# Patient Record
Sex: MALE | Race: WHITE | Employment: UNEMPLOYED | URBAN - METROPOLITAN AREA
[De-identification: names, ages, dates, MRNs, and addresses within clinical notes are randomized per-mention and may not be internally consistent; named-entity substitution may affect disease eponyms.]

---

## 2020-11-08 ENCOUNTER — HOSPITAL ENCOUNTER (EMERGENCY)
Age: 40
Discharge: HOME | End: 2020-11-09
Payer: COMMERCIAL

## 2020-11-08 LAB
ALBUMIN SERPL-MCNC: 3.6 G/DL (ref 3.5–4.6)
ALP BLD-CCNC: 104 U/L (ref 35–104)
ALT SERPL-CCNC: <5 U/L (ref 0–41)
ANION GAP SERPL CALCULATED.3IONS-SCNC: 8 MEQ/L (ref 9–15)
ANION GAP SERPL CALCULATED.3IONS-SCNC: 9 MEQ/L (ref 9–15)
AST SERPL-CCNC: 53 U/L (ref 0–40)
BANDED NEUTROPHILS RELATIVE PERCENT: 1 %
BASOPHILS ABSOLUTE: 0 K/UL (ref 0–0.2)
BASOPHILS RELATIVE PERCENT: 2 %
BETA-HYDROXYBUTYRATE: 8.2 MG/DL (ref 0.2–2.8)
BILIRUB SERPL-MCNC: <0.2 MG/DL (ref 0.2–0.7)
BILIRUBIN URINE: NEGATIVE
BLOOD, URINE: NEGATIVE
BUN BLDV-MCNC: 13 MG/DL (ref 6–20)
BUN BLDV-MCNC: 15 MG/DL (ref 6–20)
CALCIUM SERPL-MCNC: 8.2 MG/DL (ref 8.5–9.9)
CALCIUM SERPL-MCNC: 8.5 MG/DL (ref 8.5–9.9)
CHLORIDE BLD-SCNC: 84 MEQ/L (ref 95–107)
CHLORIDE BLD-SCNC: 86 MEQ/L (ref 95–107)
CHP ED QC CHECK: YES
CHP ED QC CHECK: YES
CLARITY: CLEAR
CO2: 21 MEQ/L (ref 20–31)
CO2: 22 MEQ/L (ref 20–31)
COLOR: YELLOW
CREAT SERPL-MCNC: 0.54 MG/DL (ref 0.7–1.2)
CREAT SERPL-MCNC: 0.62 MG/DL (ref 0.7–1.2)
EOSINOPHILS ABSOLUTE: 0.2 K/UL (ref 0–0.7)
EOSINOPHILS RELATIVE PERCENT: 3 %
GFR AFRICAN AMERICAN: >60
GFR AFRICAN AMERICAN: >60
GFR NON-AFRICAN AMERICAN: >60
GFR NON-AFRICAN AMERICAN: >60
GLOBULIN: 2.7 G/DL (ref 2.3–3.5)
GLUCOSE BLD-MCNC: 322 MG/DL (ref 70–99)
GLUCOSE BLD-MCNC: 365 MG/DL
GLUCOSE BLD-MCNC: 365 MG/DL (ref 60–115)
GLUCOSE BLD-MCNC: 437 MG/DL
GLUCOSE BLD-MCNC: 437 MG/DL (ref 60–115)
GLUCOSE BLD-MCNC: 468 MG/DL (ref 70–99)
GLUCOSE URINE: >=1000 MG/DL
HBA1C MFR BLD: 12.9 % (ref 4.8–5.9)
HCT VFR BLD CALC: 44.9 % (ref 42–52)
HEMOGLOBIN: 16.4 G/DL (ref 14–18)
KETONES, URINE: ABNORMAL MG/DL
LEUKOCYTE ESTERASE, URINE: NEGATIVE
LYMPHOCYTES ABSOLUTE: 2.6 K/UL (ref 1–4.8)
LYMPHOCYTES RELATIVE PERCENT: 33 %
MCH RBC QN AUTO: 33.5 PG (ref 27–31.3)
MCHC RBC AUTO-ENTMCNC: 36.6 % (ref 33–37)
MCV RBC AUTO: 91.5 FL (ref 80–100)
MONOCYTES ABSOLUTE: 0.5 K/UL (ref 0.2–0.8)
MONOCYTES RELATIVE PERCENT: 5.7 %
MYELOCYTE PERCENT: 2 %
NEUTROPHILS ABSOLUTE: 4.6 K/UL (ref 1.4–6.5)
NEUTROPHILS RELATIVE PERCENT: 55 %
NITRITE, URINE: NEGATIVE
PDW BLD-RTO: 13.5 % (ref 11.5–14.5)
PERFORMED ON: ABNORMAL
PERFORMED ON: ABNORMAL
PH UA: 6.5 (ref 5–9)
PLATELET # BLD: 267 K/UL (ref 130–400)
PLATELET SLIDE REVIEW: NORMAL
POTASSIUM SERPL-SCNC: 6.1 MEQ/L (ref 3.4–4.9)
POTASSIUM SERPL-SCNC: 6.1 MEQ/L (ref 3.4–4.9)
PROTEIN UA: NEGATIVE MG/DL
RBC # BLD: 4.91 M/UL (ref 4.7–6.1)
REASON FOR REJECTION: NORMAL
REASON FOR REJECTION: NORMAL
REJECTED TEST: NORMAL
REJECTED TEST: NORMAL
SODIUM BLD-SCNC: 114 MEQ/L (ref 135–144)
SODIUM BLD-SCNC: 116 MEQ/L (ref 135–144)
SPECIFIC GRAVITY UA: 1.03 (ref 1–1.03)
TOTAL PROTEIN: 6.3 G/DL (ref 6.3–8)
URINE REFLEX TO CULTURE: ABNORMAL
UROBILINOGEN, URINE: 0.2 E.U./DL
WBC # BLD: 8 K/UL (ref 4.8–10.8)

## 2020-11-08 PROCEDURE — 96374 THER/PROPH/DIAG INJ IV PUSH: CPT

## 2020-11-08 PROCEDURE — 96376 TX/PRO/DX INJ SAME DRUG ADON: CPT

## 2020-11-08 PROCEDURE — 2580000003 HC RX 258: Performed by: PERSONAL EMERGENCY RESPONSE ATTENDANT

## 2020-11-08 PROCEDURE — 96375 TX/PRO/DX INJ NEW DRUG ADDON: CPT

## 2020-11-08 PROCEDURE — 85025 COMPLETE CBC W/AUTO DIFF WBC: CPT

## 2020-11-08 PROCEDURE — 80053 COMPREHEN METABOLIC PANEL: CPT

## 2020-11-08 PROCEDURE — 6370000000 HC RX 637 (ALT 250 FOR IP): Performed by: PERSONAL EMERGENCY RESPONSE ATTENDANT

## 2020-11-08 PROCEDURE — 83036 HEMOGLOBIN GLYCOSYLATED A1C: CPT

## 2020-11-08 PROCEDURE — 82010 KETONE BODYS QUAN: CPT

## 2020-11-08 PROCEDURE — 36415 COLL VENOUS BLD VENIPUNCTURE: CPT

## 2020-11-08 PROCEDURE — 96361 HYDRATE IV INFUSION ADD-ON: CPT

## 2020-11-08 PROCEDURE — 99283 EMERGENCY DEPT VISIT LOW MDM: CPT

## 2020-11-08 PROCEDURE — 81003 URINALYSIS AUTO W/O SCOPE: CPT

## 2020-11-08 RX ORDER — AMOXICILLIN AND CLAVULANATE POTASSIUM 875; 125 MG/1; MG/1
1 TABLET, FILM COATED ORAL ONCE
Status: COMPLETED | OUTPATIENT
Start: 2020-11-08 | End: 2020-11-08

## 2020-11-08 RX ORDER — 0.9 % SODIUM CHLORIDE 0.9 %
1000 INTRAVENOUS SOLUTION INTRAVENOUS ONCE
Status: COMPLETED | OUTPATIENT
Start: 2020-11-08 | End: 2020-11-08

## 2020-11-08 RX ORDER — SODIUM POLYSTYRENE SULFONATE 15 G/60ML
30 SUSPENSION ORAL; RECTAL ONCE
Status: COMPLETED | OUTPATIENT
Start: 2020-11-08 | End: 2020-11-09

## 2020-11-08 RX ORDER — 0.9 % SODIUM CHLORIDE 0.9 %
1000 INTRAVENOUS SOLUTION INTRAVENOUS ONCE
Status: COMPLETED | OUTPATIENT
Start: 2020-11-08 | End: 2020-11-09

## 2020-11-08 RX ORDER — CALCIUM CHLORIDE 100 MG/ML
1 INJECTION INTRAVENOUS; INTRAVENTRICULAR ONCE
Status: COMPLETED | OUTPATIENT
Start: 2020-11-08 | End: 2020-11-09

## 2020-11-08 RX ORDER — AMOXICILLIN AND CLAVULANATE POTASSIUM 875; 125 MG/1; MG/1
1 TABLET, FILM COATED ORAL 2 TIMES DAILY
Qty: 20 TABLET | Refills: 0 | Status: SHIPPED | OUTPATIENT
Start: 2020-11-08 | End: 2020-11-18

## 2020-11-08 RX ADMIN — INSULIN HUMAN 10 UNITS: 100 INJECTION, SOLUTION PARENTERAL at 21:31

## 2020-11-08 RX ADMIN — AMOXICILLIN AND CLAVULANATE POTASSIUM 1 TABLET: 875; 125 TABLET, FILM COATED ORAL at 17:51

## 2020-11-08 RX ADMIN — SODIUM CHLORIDE 1000 ML: 9 INJECTION, SOLUTION INTRAVENOUS at 21:30

## 2020-11-08 RX ADMIN — SODIUM CHLORIDE 1000 ML: 9 INJECTION, SOLUTION INTRAVENOUS at 17:51

## 2020-11-08 ASSESSMENT — ENCOUNTER SYMPTOMS
VOMITING: 0
NAUSEA: 0
SHORTNESS OF BREATH: 0
BLOOD IN STOOL: 0
DIARRHEA: 0
RHINORRHEA: 0
COLOR CHANGE: 0
SORE THROAT: 0
ABDOMINAL PAIN: 0
COUGH: 1

## 2020-11-08 ASSESSMENT — PAIN DESCRIPTION - ORIENTATION: ORIENTATION: RIGHT

## 2020-11-08 ASSESSMENT — PAIN DESCRIPTION - LOCATION: LOCATION: EAR

## 2020-11-08 ASSESSMENT — PAIN SCALES - GENERAL: PAINLEVEL_OUTOF10: 7

## 2020-11-08 NOTE — ED TRIAGE NOTES
Pt comes to er  Thinking that his blood sugar is high. He  Believes this as he has been thirsty, his mouth has been dry and he has been getting headaches. Pt has never been diagnosed with diabetes.  Pt also  C/o a sinus infection and a right ear ache

## 2020-11-08 NOTE — ED PROVIDER NOTES
[facility] ED  eMERGENCY dEPARTMENT eNCOUnter      Pt Name: Tarsha Saeed  MRN: [id number]  [place] 1980  Date of evaluation: 11/8/2020  Provider: KARY Mcintyre      HISTORY OF PRESENT ILLNESS    Tarsha Saeed is a 36 y.o. male with PMHx of tobacco abuse presents to the emergency department with sinus infection and polyuria. Patient states for the past couple weeks he has increased urination, increased thirst, and feels his mouth is dry. For the past couple days he has had a right earache with postnasal drip and chest congestion, dark green mucus. He denies fevers, Covid concern, chest pain, shortness of breath, belly pain, nausea, vomiting, diarrhea. No ill contacts. He does get headaches in the morning but states it goes away after Tylenol. HPI    Nursing Notes were reviewed. REVIEW OF SYSTEMS       Review of Systems   Constitutional: Negative for appetite change, chills and fever. HENT: Positive for ear pain and postnasal drip. Negative for congestion, rhinorrhea and sore throat. Respiratory: Positive for cough. Negative for shortness of breath. Cardiovascular: Negative for chest pain. Gastrointestinal: Negative for abdominal pain, blood in stool, diarrhea, nausea and vomiting. Endocrine: Positive for polydipsia and polyuria. Genitourinary: Positive for frequency. Negative for difficulty urinating. Musculoskeletal: Negative for neck stiffness. Skin: Negative for color change and rash. Neurological: Positive for headaches. Negative for dizziness, syncope, weakness, light-headedness and numbness. All other systems reviewed and are negative. PAST MEDICAL HISTORY   No past medical history on file. SURGICAL HISTORY     No past surgical history on file. CURRENT MEDICATIONS       Previous Medications    No medications on file       ALLERGIES     Patient has no known allergies. FAMILY HISTORY     No family history on file. SOCIAL HISTORY       Social History     Socioeconomic History    Marital status:      Spouse name: Not on file    Number of children: Not on file    Years of education: Not on file    Highest education level: Not on file   Occupational History    Not on file   Social Needs    Financial resource strain: Not on file    Food insecurity     Worry: Not on file     Inability: Not on file    Transportation needs     Medical: Not on file     Non-medical: Not on file   Tobacco Use    Smoking status: Not on file   Substance and Sexual Activity    Alcohol use: Not on file    Drug use: Not on file    Sexual activity: Not on file   Lifestyle    Physical activity     Days per week: Not on file     Minutes per session: Not on file    Stress: Not on file   Relationships    Social connections     Talks on phone: Not on file     Gets together: Not on file     Attends Sikhism service: Not on file     Active member of club or organization: Not on file     Attends meetings of clubs or organizations: Not on file     Relationship status: Not on file    Intimate partner violence     Fear of current or ex partner: Not on file     Emotionally abused: Not on file     Physically abused: Not on file     Forced sexual activity: Not on file   Other Topics Concern    Not on file   Social History Narrative    Not on file         PHYSICAL EXAM         ED Triage Vitals [11/08/20 1713]   BP Temp Temp Source Pulse Resp SpO2 Height Weight   (!) 102/95 97.9 °F (36.6 °C) Oral 116 20 95 % 5' 6\" (1.676 m) 300 lb (136.1 kg)       Physical Exam  Constitutional:       Appearance: He is well-developed. HENT:      Head: Normocephalic and atraumatic. Ears:      Comments: Bilateral TMs with moderate cerumen, unable to fully visualize left TM but what is visualized shows no otitis media. Right TM erythematous, no perforation noted     Mouth/Throat:      Mouth: Mucous membranes are dry.    Eyes:      Conjunctiva/sclera: Conjunctivae normal.      Pupils: Pupils are equal, round, and reactive to light. Neck:      Musculoskeletal: Normal range of motion and neck supple. Trachea: No tracheal deviation. Cardiovascular:      Heart sounds: Normal heart sounds. Pulmonary:      Effort: Pulmonary effort is normal. No respiratory distress. Breath sounds: Normal breath sounds. No stridor. Abdominal:      General: Bowel sounds are normal. There is no distension. Palpations: Abdomen is soft. There is no mass. Tenderness: There is no abdominal tenderness. There is no guarding or rebound. Musculoskeletal: Normal range of motion. Skin:     General: Skin is warm and dry. Capillary Refill: Capillary refill takes less than 2 seconds. Findings: No rash. Neurological:      Mental Status: He is alert and oriented to person, place, and time. Deep Tendon Reflexes: Reflexes are normal and symmetric. Psychiatric:         Behavior: Behavior normal.         Thought Content: Thought content normal.         Judgment: Judgment normal.         DIAGNOSTIC RESULTS     EKG:All EKG's are interpreted by the Emergency Department Physician who either signs or Co-signs this chart in the absence of a cardiologist.    EKG shows sinus tachycardia, rate 104, normal intervals, normal axis, no ST segment changes.   No peak T waves    RADIOLOGY:   Non-plain film images such as CT, Ultrasound and MRI are read by theradiologist. Plain radiographic images are visualized and preliminarily interpreted by the emergency physician with the below findings:    Interpretation per theRadiologist below, if available at the time of this note:    No orders to display           LABS:  Labs Reviewed   CBC WITH AUTO DIFFERENTIAL - Abnormal; Notable for the following components:       Result Value    MCH 33.5 (*)     All other components within normal limits   URINE RT REFLEX TO CULTURE - Abnormal; Notable for the following components:    Glucose, Ur >=1000 (*)     Ketones, Urine TRACE (*)     All other components within normal limits   BETA-HYDROXYBUTYRATE - Abnormal; Notable for the following components:    Beta-Hydroxybutyrate 8.2 (*)     All other components within normal limits   HEMOGLOBIN A1C - Abnormal; Notable for the following components:    Hemoglobin A1C 12.9 (*)     All other components within normal limits   COMPREHENSIVE METABOLIC PANEL - Abnormal; Notable for the following components:    Sodium 114 (*)     Potassium 6.1 (*)     Chloride 84 (*)     Glucose 468 (*)     CREATININE 0.54 (*)     AST 53 (*)     All other components within normal limits    Narrative:     REDRAW  CALL  [place]  [facility] tel. [phone],  GLU, SODIUM, POTASSIUM results called to and read back by [facility] DILCIA RN,  11/08/2020 21:01, by Fredοsergio 193 - Abnormal; Notable for the following components:    Sodium 116 (*)     Potassium 6.1 (*)     Chloride 86 (*)     Anion Gap 8 (*)     Glucose 322 (*)     CREATININE 0.62 (*)     Calcium 8.2 (*)     All other components within normal limits    Narrative:     CALL  [facility] tel. [phone],  SODIUM, POTASSIUM results called to and read back by [facility] DILCIA RN,  11/08/2020 23:28, by Marthaμποsergio 193 - Abnormal; Notable for the following components:    Sodium 118 (*)     Potassium 5.1 (*)     Chloride 87 (*)     Glucose 295 (*)     CREATININE 0.61 (*)     All other components within normal limits    Narrative:     CALL  [facility] tel. [phone],  SODIUM results called to and read back by [facility] DILCIA RN, 11/09/2020  02:25, by [facility]   POCT GLUCOSE - Abnormal; Notable for the following components:    POC Glucose 437 (*)     All other components within normal limits   POCT GLUCOSE - Abnormal; Notable for the following components:    POC Glucose 365 (*)     All other components within normal limits   POCT GLUCOSE - Normal   POCT GLUCOSE - Normal   SPECIMEN REJECTION   SPECIMEN REJECTION    Narrative: ORDER WAS CANCELLED 11/08/2020 19:49, Rejected: Hemolyzed. Redraw       All other labs were within normal range or not returned as of this dictation. EMERGENCY DEPARTMENT COURSE and DIFFERENTIAL DIAGNOSIS/MDM:   Vitals:    Vitals:    11/08/20 2100 11/08/20 2200 11/08/20 2230 11/09/20 0115   BP: (!) 132/97 (!) 158/98 (!) 156/96 (!) 158/100   Pulse:  105 101 110   Resp:  14 18 20   Temp:       TempSrc:       SpO2: 94% 97% 96% 97%   Weight:       Height:             MDM    Patient does have new onset type 2 diabetes. Sodium 114, corrected 120, potassium 6.1, chloride 84, glucose 468, hemoglobin A1c 12.9, beta hydroxy 8.2. Patient was given 2 L IV fluids, insulin 10 units IV. BMP repeated. Minimal change. Sodium 116, potassium 6.1, glucose 322. Patient states he cannot be admitted to the hospital.  He has a 5year-old autistic son at home. His fiancée is currently watching the son but she does work in the morning. He is refusing hospital admission. He is willing to stay in the emergency room until lab work improves. EKG ordered, calcium, insulin, Kayexalate, 1 L IV fluids. Repeat BMP shows sodium 118 (corrected 120), potassium improved to 5.1, chloride 87, glucose 295. Patient was given additional 1 L IV fluid and DC'd with Augmentin and Metformin with endocrinology follow up. Standard anticipatory guidance given to patient upon discharge. Have given them a specific time frame in which to follow-up and who to follow-up with. I have also advised them that they should return to the emergency department if they get worse, or not getting better or develop any new or concerning symptoms. Patient demonstrates understanding. CRITICAL CARE TIME   Total Critical Caretime was 0 minutes, excluding separately reportable procedures. There was a high probability of clinically significant/life threatening deterioration in the patient's condition which required my urgent intervention. Procedures    FINAL IMPRESSION      1. New onset type 2 diabetes mellitus ([facility] [address]    2. Non-recurrent acute serous otitis media of right ear    3. Type 2 diabetes mellitus with hyperglycemia, without long-term current use of insulin ([facility] [address]    4. Hyperkalemia    5. Hyponatremia          DISPOSITION/PLAN   DISPOSITION  11/08/2020 11:33:53 PM      PATIENT REFERRED TO:  Jocelyne Harris MD  [facility] [address] [facility]    Call         DISCHARGE MEDICATIONS:  New Prescriptions    AMOXICILLIN-CLAVULANATE (AUGMENTIN) 875-125 MG PER TABLET    Take 1 tablet by mouth 2 times daily for 10 days    METFORMIN (GLUCOPHAGE) 500 MG TABLET    Take 1 tablet by mouth 2 times daily (with meals)          (Please notethat portions of this note were completed with a voice recognition program.  Efforts were made to edit the dictations but occasionally words are mis-transcribed. )    KARY Mcintyre (electronically signed)  Emergency Physician Assistant         Leyda Ramires, [place]  11/12/20 6177

## 2020-11-09 ENCOUNTER — OFFICE VISIT (OUTPATIENT)
Dept: ENDOCRINOLOGY | Age: 40
End: 2020-11-09
Payer: COMMERCIAL

## 2020-11-09 VITALS
DIASTOLIC BLOOD PRESSURE: 93 MMHG | WEIGHT: 287 LBS | BODY MASS INDEX: 46.12 KG/M2 | SYSTOLIC BLOOD PRESSURE: 158 MMHG | HEIGHT: 66 IN | HEART RATE: 99 BPM

## 2020-11-09 VITALS
OXYGEN SATURATION: 94 % | HEART RATE: 104 BPM | DIASTOLIC BLOOD PRESSURE: 95 MMHG | BODY MASS INDEX: 48.21 KG/M2 | RESPIRATION RATE: 20 BRPM | SYSTOLIC BLOOD PRESSURE: 188 MMHG | WEIGHT: 300 LBS | TEMPERATURE: 97.9 F | HEIGHT: 66 IN

## 2020-11-09 PROBLEM — I10 ESSENTIAL HYPERTENSION: Status: ACTIVE | Noted: 2020-11-09

## 2020-11-09 LAB
ANION GAP SERPL CALCULATED.3IONS-SCNC: 9 MEQ/L (ref 9–15)
BUN BLDV-MCNC: 11 MG/DL (ref 6–20)
CALCIUM SERPL-MCNC: 8.7 MG/DL (ref 8.5–9.9)
CHLORIDE BLD-SCNC: 87 MEQ/L (ref 95–107)
CHP ED QC CHECK: NORMAL
CO2: 22 MEQ/L (ref 20–31)
CREAT SERPL-MCNC: 0.61 MG/DL (ref 0.7–1.2)
EKG ATRIAL RATE: 104 BPM
EKG P AXIS: 50 DEGREES
EKG P-R INTERVAL: 170 MS
EKG Q-T INTERVAL: 348 MS
EKG QRS DURATION: 94 MS
EKG QTC CALCULATION (BAZETT): 457 MS
EKG R AXIS: 19 DEGREES
EKG T AXIS: 75 DEGREES
EKG VENTRICULAR RATE: 104 BPM
GFR AFRICAN AMERICAN: >60
GFR NON-AFRICAN AMERICAN: >60
GLUCOSE BLD-MCNC: 259 MG/DL
GLUCOSE BLD-MCNC: 295 MG/DL (ref 70–99)
POTASSIUM SERPL-SCNC: 5.1 MEQ/L (ref 3.4–4.9)
SODIUM BLD-SCNC: 118 MEQ/L (ref 135–144)

## 2020-11-09 PROCEDURE — 82962 GLUCOSE BLOOD TEST: CPT | Performed by: PHYSICIAN ASSISTANT

## 2020-11-09 PROCEDURE — 2580000003 HC RX 258: Performed by: PERSONAL EMERGENCY RESPONSE ATTENDANT

## 2020-11-09 PROCEDURE — 93005 ELECTROCARDIOGRAM TRACING: CPT | Performed by: PERSONAL EMERGENCY RESPONSE ATTENDANT

## 2020-11-09 PROCEDURE — 93010 ELECTROCARDIOGRAM REPORT: CPT | Performed by: INTERNAL MEDICINE

## 2020-11-09 PROCEDURE — 6370000000 HC RX 637 (ALT 250 FOR IP): Performed by: PERSONAL EMERGENCY RESPONSE ATTENDANT

## 2020-11-09 PROCEDURE — G8417 CALC BMI ABV UP PARAM F/U: HCPCS | Performed by: PHYSICIAN ASSISTANT

## 2020-11-09 PROCEDURE — 4004F PT TOBACCO SCREEN RCVD TLK: CPT | Performed by: PHYSICIAN ASSISTANT

## 2020-11-09 PROCEDURE — G8484 FLU IMMUNIZE NO ADMIN: HCPCS | Performed by: PHYSICIAN ASSISTANT

## 2020-11-09 PROCEDURE — 99204 OFFICE O/P NEW MOD 45 MIN: CPT | Performed by: PHYSICIAN ASSISTANT

## 2020-11-09 PROCEDURE — 2500000003 HC RX 250 WO HCPCS: Performed by: PERSONAL EMERGENCY RESPONSE ATTENDANT

## 2020-11-09 PROCEDURE — 3046F HEMOGLOBIN A1C LEVEL >9.0%: CPT | Performed by: PHYSICIAN ASSISTANT

## 2020-11-09 PROCEDURE — G8427 DOCREV CUR MEDS BY ELIG CLIN: HCPCS | Performed by: PHYSICIAN ASSISTANT

## 2020-11-09 PROCEDURE — 6360000002 HC RX W HCPCS: Performed by: PERSONAL EMERGENCY RESPONSE ATTENDANT

## 2020-11-09 PROCEDURE — 2022F DILAT RTA XM EVC RTNOPTHY: CPT | Performed by: PHYSICIAN ASSISTANT

## 2020-11-09 RX ORDER — LISINOPRIL 20 MG/1
20 TABLET ORAL DAILY
Qty: 30 TABLET | Refills: 5 | Status: SHIPPED | OUTPATIENT
Start: 2020-11-09 | End: 2021-03-24 | Stop reason: SDUPTHER

## 2020-11-09 RX ORDER — LANCETS 30 GAUGE
1 EACH MISCELLANEOUS
Qty: 150 EACH | Refills: 3 | Status: SHIPPED | OUTPATIENT
Start: 2020-11-09 | End: 2021-03-23

## 2020-11-09 RX ORDER — KETOROLAC TROMETHAMINE 30 MG/ML
30 INJECTION, SOLUTION INTRAMUSCULAR; INTRAVENOUS ONCE
Status: COMPLETED | OUTPATIENT
Start: 2020-11-09 | End: 2020-11-09

## 2020-11-09 RX ORDER — INSULIN GLARGINE 100 [IU]/ML
30 INJECTION, SOLUTION SUBCUTANEOUS NIGHTLY
Qty: 5 PEN | Refills: 3 | Status: SHIPPED | OUTPATIENT
Start: 2020-11-09 | End: 2021-03-24 | Stop reason: ALTCHOICE

## 2020-11-09 RX ORDER — 0.9 % SODIUM CHLORIDE 0.9 %
1000 INTRAVENOUS SOLUTION INTRAVENOUS ONCE
Status: COMPLETED | OUTPATIENT
Start: 2020-11-09 | End: 2020-11-09

## 2020-11-09 RX ORDER — GLUCOSAMINE HCL/CHONDROITIN SU 500-400 MG
1 CAPSULE ORAL
Qty: 150 STRIP | Refills: 3 | Status: SHIPPED | OUTPATIENT
Start: 2020-11-09 | End: 2021-03-23

## 2020-11-09 RX ORDER — INSULIN LISPRO 100 [IU]/ML
15 INJECTION, SOLUTION INTRAVENOUS; SUBCUTANEOUS
Qty: 5 PEN | Refills: 3 | Status: SHIPPED | OUTPATIENT
Start: 2020-11-09 | End: 2021-03-01

## 2020-11-09 RX ADMIN — SODIUM POLYSTYRENE SULFONATE 30 G: 15 SUSPENSION ORAL; RECTAL at 00:33

## 2020-11-09 RX ADMIN — KETOROLAC TROMETHAMINE 30 MG: 30 INJECTION, SOLUTION INTRAMUSCULAR at 02:05

## 2020-11-09 RX ADMIN — CALCIUM CHLORIDE 1 G: 100 INJECTION, SOLUTION INTRAVENOUS; INTRAVENTRICULAR at 00:33

## 2020-11-09 RX ADMIN — SODIUM CHLORIDE 1000 ML: 9 INJECTION, SOLUTION INTRAVENOUS at 00:33

## 2020-11-09 RX ADMIN — SODIUM CHLORIDE 1000 ML: 9 INJECTION, SOLUTION INTRAVENOUS at 02:48

## 2020-11-09 RX ADMIN — INSULIN HUMAN 8 UNITS: 100 INJECTION, SOLUTION PARENTERAL at 00:33

## 2020-11-09 ASSESSMENT — PAIN DESCRIPTION - PAIN TYPE: TYPE: ACUTE PAIN

## 2020-11-09 ASSESSMENT — PAIN SCALES - GENERAL: PAINLEVEL_OUTOF10: 3

## 2020-11-09 ASSESSMENT — ENCOUNTER SYMPTOMS
SHORTNESS OF BREATH: 0
WHEEZING: 0
COUGH: 0
SINUS PRESSURE: 0
VOMITING: 0
ABDOMINAL PAIN: 0
NAUSEA: 0
SORE THROAT: 0
DIARRHEA: 0
BLURRED VISION: 1
EYE REDNESS: 0
EYE PAIN: 0
RHINORRHEA: 0

## 2020-11-09 ASSESSMENT — PAIN DESCRIPTION - LOCATION: LOCATION: EAR

## 2020-11-09 ASSESSMENT — PAIN DESCRIPTION - ORIENTATION: ORIENTATION: RIGHT

## 2020-11-09 NOTE — PATIENT INSTRUCTIONS
Endocrinology-diabetes    1. Check your blood sugars 4 times a day, before meals and at night  2. Document these numbers and a blood glucose log and bring them with you to your follow-up appointment. 3. Do not take your mealtime insulin if your blood sugars less than 120  4. Call our office if you have blood sugars less than 80 or greater then 300 on two or more occasions  5. Call our office if you have any questions regarding your blood sugars or insulin dosing regiment  6. Signs of low blood sugar include sweating , heart racing, dizziness and weakness. Check your blood sugar if you have any of these symptoms. Medications-  7. Lantus 30 unit at night  8. Humalog 15 units three times daily before meals  9. Metformin 500 mg twice daily before meals   10. Lisinopril 20 mg daily   11. Freestyle Gautam ordered   12. Glucometer ordered   13. Diabetic Education ordered  14.  Follow up in 4 weeks

## 2020-11-09 NOTE — ED NOTES
Sodium 114  Potassium 6.1    Hemolyzed, per  blood looks like strawberry milk.       Geronimo Merino RN  11/08/20 7607

## 2020-11-09 NOTE — PROGRESS NOTES
2020    Assessment:       Diagnosis Orders   1. Uncontrolled type 2 diabetes mellitus with hyperglycemia ([address]  POCT Glucose    Lipid Panel    Lila Johnson    C-Peptide   2. Essential hypertension       PLAN:     1. Start Lantus 30 unit at night  2. Start Humalog 15 units three times daily before meals  3. Start Metformin 500 mg twice daily before meals   4. Start lisinopril 20 mg daily   5. Freestyle Gautam ordered   6. Glucometer ordered   7. Diabetic Education ordered  8. Refer to Doe Mera PA-C for primary care at next visit  9.  Follow up in 4 weeks       Orders Placed This Encounter   Procedures    Lipid Panel     Standing Status:   Future     Standing Expiration Date:   2021     Order Specific Question:   Is Patient Fasting?/# of Hours     Answer:   8    C-Peptide     Standing Status:   Future     Standing Expiration Date:   2021   [facility] Lila Sylvester     Referral Priority:   Routine     Referral Type:   Eval and Treat     Referral Reason:   Specialty Services Required     Number of Visits Requested:   1    POCT Glucose     Orders Placed This Encounter   Medications    insulin glargine (LANTUS SOLOSTAR) 100 UNIT/ML injection pen     Sig: Inject 30 Units into the skin nightly     Dispense:  5 pen     Refill:  3    insulin lispro, 1 Unit Dial, (HUMALOG KWIKPEN) 100 UNIT/ML SOPN     Sig: Inject 15 Units into the skin 3 times daily (before meals)     Dispense:  5 pen     Refill:  3    Insulin Pen Needle (NOVOFINE) 32G X 6 MM MISC     Si Device by Does not apply route 4 times daily (before meals and nightly)     Dispense:  300 each     Refill:  3    Continuous Blood Gluc Sensor (FREESTYLE GAUTAM 2 SENSOR SYSTM) MISC     Si Device by Does not apply route continuous     Dispense:  2 each     Refill:  3    Continuous Blood Gluc  (FREESTYLE GAUTAM 2 READER SYSTM) ANABEL     Si Device by Does not apply route continuous     Dispense:  1 Device Refill:  0    blood glucose monitor strips     Si strip by Other route 4 times daily (before meals and nightly) Pt test 4x daily Dx E11.65. May substitute for generic or insurance covered product     Dispense:  150 strip     Refill:  3    blood glucose monitor kit and supplies     Si kit by Other route 4 times daily (before meals and nightly) Pt test 4x daily Dx E11.65. May substitute for generic or insurance covered product     Dispense:  1 kit     Refill:  0    Lancets MISC     Si each by Does not apply route 4 times daily (before meals and nightly) Pt test 4x daily Dx E11.65. May substitute for generic or insurance covered product     Dispense:  150 each     Refill:  3    lisinopril (PRINIVIL;ZESTRIL) 20 MG tablet     Sig: Take 1 tablet by mouth daily     Dispense:  30 tablet     Refill:  5     Return in about 4 weeks (around 2020) for Diabetes. Subjective:     Chief Complaint   Patient presents with    Diabetes     Vitals:    20 1210 20 1220   BP: (!) 172/125 (!) 158/93   Site: Right Upper Arm    Position: Sitting    Cuff Size: Large Adult    Pulse: 99    Weight: 287 lb (130.2 kg)    Height: 5' 6\" (1.676 m)      Wt Readings from Last 3 Encounters:   20 287 lb (130.2 kg)   20 300 lb (136.1 kg)     BP Readings from Last 3 Encounters:   20 (!) 158/93   20 (!) 188/95     Mary French is a 66-year-old male who went to the ER on 2020 with severe nasal congestion and right ear pain. Laboratory studies revealed severe hyperglycemia with hyponatremia and hyperkalemia. His A1c is 12.9%. The patient denied hospital admission, was kept in the ER for many hours and given IV insulin and 4 L of IV fluid with symptomatic relief. He was discharged home in antibiotics and is here to follow-up with me today regarding his diabetes. Diabetes   He presents for his initial diabetic visit. He has type 2 diabetes mellitus. His disease course has been worsening.  There file    Drug use: Not on file    Sexual activity: Not on file   Lifestyle    Physical activity     Days per week: Not on file     Minutes per session: Not on file    Stress: Not on file   Relationships    Social connections     Talks on phone: Not on file     Gets together: Not on file     Attends Catholic service: Not on file     Active member of club or organization: Not on file     Attends meetings of clubs or organizations: Not on file     Relationship status: Not on file    Intimate partner violence     Fear of current or ex partner: Not on file     Emotionally abused: Not on file     Physically abused: Not on file     Forced sexual activity: Not on file   Other Topics Concern    Not on file   Social History Narrative    Not on file     History reviewed. No pertinent family history. No Known Allergies    Current Outpatient Medications:     insulin glargine (LANTUS SOLOSTAR) 100 UNIT/ML injection pen, Inject 30 Units into the skin nightly, Disp: 5 pen, Rfl: 3    insulin lispro, 1 Unit Dial, (HUMALOG KWIKPEN) 100 UNIT/ML SOPN, Inject 15 Units into the skin 3 times daily (before meals), Disp: 5 pen, Rfl: 3    Insulin Pen Needle (NOVOFINE) 32G X 6 MM MISC, 1 Device by Does not apply route 4 times daily (before meals and nightly), Disp: 300 each, Rfl: 3    Continuous Blood Gluc Sensor (FREESTYLE JEFF 2 SENSOR SYSTM) MISC, 1 Device by Does not apply route continuous, Disp: 2 each, Rfl: 3    Continuous Blood Gluc  (FREESTYLE JEFF 2 READER SYSTM) ANABEL, 1 Device by Does not apply route continuous, Disp: 1 Device, Rfl: 0    blood glucose monitor strips, 1 strip by Other route 4 times daily (before meals and nightly) Pt test 4x daily Dx E11.65. May substitute for generic or insurance covered product, Disp: 150 strip, Rfl: 3    blood glucose monitor kit and supplies, 1 kit by Other route 4 times daily (before meals and nightly) Pt test 4x daily Dx E11.65.   May substitute for generic or insurance covered product, Disp: 1 kit, Rfl: 0    Lancets MISC, 1 each by Does not apply route 4 times daily (before meals and nightly) Pt test 4x daily Dx E11.65. May substitute for generic or insurance covered product, Disp: 150 each, Rfl: 3    lisinopril (PRINIVIL;ZESTRIL) 20 MG tablet, Take 1 tablet by mouth daily, Disp: 30 tablet, Rfl: 5    metFORMIN (GLUCOPHAGE) 500 MG tablet, Take 1 tablet by mouth 2 times daily (with meals), Disp: 60 tablet, Rfl: 0    amoxicillin-clavulanate (AUGMENTIN) 875-125 MG per tablet, Take 1 tablet by mouth 2 times daily for 10 days, Disp: 20 tablet, Rfl: 0  Lab Results   Component Value Date     (LL) 11/08/2020    K 5.1 (H) 11/08/2020    CL 87 (L) 11/08/2020    CO2 22 11/08/2020    BUN 11 11/08/2020    CREATININE 0.61 (L) 11/08/2020    GLUCOSE 259 11/09/2020    CALCIUM 8.7 11/08/2020    PROT 6.3 11/08/2020    LABALBU 3.6 11/08/2020    BILITOT <0.2 11/08/2020    ALKPHOS 104 11/08/2020    AST 53 (H) 11/08/2020    ALT <5 11/08/2020    LABGLOM >60.0 11/08/2020    GFRAA >60.0 11/08/2020    GLOB 2.7 11/08/2020     Lab Results   Component Value Date    WBC 8.0 11/08/2020    HGB 16.4 11/08/2020    HCT 44.9 11/08/2020    MCV 91.5 11/08/2020     11/08/2020     Lab Results   Component Value Date    LABA1C 12.9 (H) 11/08/2020     No results found for: CHOLFAST, TRIGLYCFAST, HDL, LDLCALC, CHOL, TRIG  No results found for: TESTM  No results found for: TSH, TSHREFLEX, FT3, T4FREE  No results found for: TPOABS    Review of Systems   Constitutional: Positive for fatigue. Negative for chills and fever. HENT: Negative for congestion, ear pain, postnasal drip, rhinorrhea, sinus pressure and sore throat. Eyes: Positive for blurred vision. Negative for pain and redness. Respiratory: Negative for cough, shortness of breath and wheezing. Cardiovascular: Negative for chest pain, palpitations and leg swelling. Gastrointestinal: Negative for abdominal pain, diarrhea, nausea and vomiting.

## 2020-11-17 ENCOUNTER — TELEPHONE (OUTPATIENT)
Dept: ENDOCRINOLOGY | Age: 40
End: 2020-11-17

## 2020-12-05 DIAGNOSIS — E11.65 UNCONTROLLED TYPE 2 DIABETES MELLITUS WITH HYPERGLYCEMIA (HCC): ICD-10-CM

## 2020-12-05 LAB
ANION GAP SERPL CALCULATED.3IONS-SCNC: 10 MEQ/L (ref 9–15)
BILIRUBIN URINE: NEGATIVE
BLOOD, URINE: NEGATIVE
BUN BLDV-MCNC: 17 MG/DL (ref 6–20)
CALCIUM SERPL-MCNC: 9.4 MG/DL (ref 8.5–9.9)
CHLORIDE BLD-SCNC: 101 MEQ/L (ref 95–107)
CHOLESTEROL, TOTAL: 229 MG/DL (ref 0–199)
CLARITY: CLEAR
CO2: 25 MEQ/L (ref 20–31)
COLOR: YELLOW
CREAT SERPL-MCNC: 0.56 MG/DL (ref 0.7–1.2)
CREATININE URINE: 194.8 MG/DL
GFR AFRICAN AMERICAN: >60
GFR NON-AFRICAN AMERICAN: >60
GLUCOSE BLD-MCNC: 193 MG/DL (ref 70–99)
GLUCOSE URINE: NEGATIVE MG/DL
HDLC SERPL-MCNC: 34 MG/DL (ref 40–59)
KETONES, URINE: NEGATIVE MG/DL
LDL CHOLESTEROL CALCULATED: 149 MG/DL (ref 0–129)
LEUKOCYTE ESTERASE, URINE: NEGATIVE
MICROALBUMIN UR-MCNC: 6.5 MG/DL
MICROALBUMIN/CREAT UR-RTO: 33.4 MG/G (ref 0–30)
NITRITE, URINE: NEGATIVE
PH UA: 5 (ref 5–9)
POTASSIUM SERPL-SCNC: 4.3 MEQ/L (ref 3.4–4.9)
PROTEIN UA: ABNORMAL MG/DL
SODIUM BLD-SCNC: 136 MEQ/L (ref 135–144)
SPECIFIC GRAVITY UA: 1.02 (ref 1–1.03)
TRIGL SERPL-MCNC: 228 MG/DL (ref 0–150)
TSH SERPL DL<=0.05 MIU/L-ACNC: 1.78 UIU/ML (ref 0.44–3.86)
UROBILINOGEN, URINE: 0.2 E.U./DL
VITAMIN D 25-HYDROXY: 11.1 NG/ML (ref 30–100)

## 2020-12-08 LAB — C-PEPTIDE: 4.4 NG/ML (ref 1.1–4.4)

## 2020-12-09 ENCOUNTER — TELEPHONE (OUTPATIENT)
Dept: ENDOCRINOLOGY | Age: 40
End: 2020-12-09

## 2020-12-09 NOTE — TELEPHONE ENCOUNTER
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 He would like to take on line classes since he is at risk due to his diabetes. He wanted to know if you could write a letter to excuse him from attending on campus classes. Please advise.

## 2020-12-16 ENCOUNTER — OFFICE VISIT (OUTPATIENT)
Dept: ENDOCRINOLOGY | Age: 40
End: 2020-12-16
Payer: COMMERCIAL

## 2020-12-16 VITALS
HEART RATE: 88 BPM | BODY MASS INDEX: 45 KG/M2 | WEIGHT: 280 LBS | SYSTOLIC BLOOD PRESSURE: 137 MMHG | HEIGHT: 66 IN | DIASTOLIC BLOOD PRESSURE: 86 MMHG

## 2020-12-16 LAB
CHP ED QC CHECK: NORMAL
GLUCOSE BLD-MCNC: 153 MG/DL

## 2020-12-16 PROCEDURE — 99213 OFFICE O/P EST LOW 20 MIN: CPT | Performed by: PHYSICIAN ASSISTANT

## 2020-12-16 PROCEDURE — 2022F DILAT RTA XM EVC RTNOPTHY: CPT | Performed by: PHYSICIAN ASSISTANT

## 2020-12-16 PROCEDURE — 3046F HEMOGLOBIN A1C LEVEL >9.0%: CPT | Performed by: PHYSICIAN ASSISTANT

## 2020-12-16 PROCEDURE — G8417 CALC BMI ABV UP PARAM F/U: HCPCS | Performed by: PHYSICIAN ASSISTANT

## 2020-12-16 PROCEDURE — G8427 DOCREV CUR MEDS BY ELIG CLIN: HCPCS | Performed by: PHYSICIAN ASSISTANT

## 2020-12-16 PROCEDURE — G8484 FLU IMMUNIZE NO ADMIN: HCPCS | Performed by: PHYSICIAN ASSISTANT

## 2020-12-16 PROCEDURE — 82962 GLUCOSE BLOOD TEST: CPT | Performed by: PHYSICIAN ASSISTANT

## 2020-12-16 PROCEDURE — 4004F PT TOBACCO SCREEN RCVD TLK: CPT | Performed by: PHYSICIAN ASSISTANT

## 2020-12-16 RX ORDER — BUSPIRONE HYDROCHLORIDE 5 MG/1
5 TABLET ORAL 2 TIMES DAILY
COMMUNITY

## 2020-12-16 RX ORDER — DULAGLUTIDE 1.5 MG/.5ML
1.5 INJECTION, SOLUTION SUBCUTANEOUS WEEKLY
Qty: 4 PEN | Refills: 3 | Status: SHIPPED | OUTPATIENT
Start: 2020-12-16 | End: 2021-03-09 | Stop reason: SDUPTHER

## 2020-12-16 RX ORDER — ATORVASTATIN CALCIUM 20 MG/1
20 TABLET, FILM COATED ORAL DAILY
Qty: 90 TABLET | Refills: 1 | Status: SHIPPED | OUTPATIENT
Start: 2020-12-16 | End: 2021-03-24 | Stop reason: SINTOL

## 2020-12-16 ASSESSMENT — ENCOUNTER SYMPTOMS
NAUSEA: 0
ABDOMINAL PAIN: 0
EYE PAIN: 0
RHINORRHEA: 0
SHORTNESS OF BREATH: 0
EYE REDNESS: 0
VOMITING: 0
SINUS PRESSURE: 0
DIARRHEA: 0
COUGH: 0
WHEEZING: 0
BLURRED VISION: 1
SORE THROAT: 0

## 2020-12-16 NOTE — PROGRESS NOTES
file    Drug use: Not on file    Sexual activity: Not on file   Lifestyle    Physical activity     Days per week: Not on file     Minutes per session: Not on file    Stress: Not on file   Relationships    Social connections     Talks on phone: Not on file     Gets together: Not on file     Attends Ho[employer] service: Not on file     Active member of club or organization: Not on file     Attends meetings of clubs or organizations: Not on file     Relationship status: Not on file    Intimate partner violence     Fear of current or ex partner: Not on file     Emotionally abused: Not on file     Physically abused: Not on file     Forced sexual activity: Not on file   Other Topics Concern    Not on file   Social History Narrative    Not on file     No family history on file. No Known Allergies    Current Outpatient Medications:     Cholecalciferol (VITAMIN D3) 25 MCG (1000 UT) CAPS, Take by mouth 4 daily, Disp: , Rfl:     busPIRone (BUSPAR) 5 MG tablet, Take 5 mg by mouth 2 times daily, Disp: , Rfl:     insulin glargine (LANTUS SOLOSTAR) 100 UNIT/ML injection pen, Inject 30 Units into the skin nightly, Disp: 5 pen, Rfl: 3    insulin lispro, 1 Unit Dial, (HUMALOG KWIKPEN) 100 UNIT/ML SOPN, Inject 15 Units into the skin 3 times daily (before meals), Disp: 5 pen, Rfl: 3    Insulin Pen Needle (NOVOFINE) 32G X 6 MM MISC, 1 Device by Does not apply route 4 times daily (before meals and nightly), Disp: 300 each, Rfl: 3    Continuous Blood Gluc Sensor (FREESTYLE JEFF 2 SENSOR SYSTM) MISC, 1 Device by Does not apply route continuous, Disp: 2 each, Rfl: 3    Continuous Blood Gluc  (FREESTYLE JEFF 2 READER SYSTM) ANABEL, 1 Device by Does not apply route continuous, Disp: 1 Device, Rfl: 0    blood glucose monitor strips, 1 strip by Other route 4 times daily (before meals and nightly) Pt test 4x daily Dx E11.65.   May substitute for generic or insurance covered product, Disp: 150 strip, Rfl: 3    blood glucose monitor kit and supplies, 1 kit by Other route 4 times daily (before meals and nightly) Pt test 4x daily Dx E11.65. May substitute for generic or insurance covered product, Disp: 1 kit, Rfl: 0    Lancets MISC, 1 each by Does not apply route 4 times daily (before meals and nightly) Pt test 4x daily Dx E11.65. May substitute for generic or insurance covered product, Disp: 150 each, Rfl: 3    lisinopril (PRINIVIL;ZESTRIL) 20 MG tablet, Take 1 tablet by mouth daily, Disp: 30 tablet, Rfl: 5    metFORMIN (GLUCOPHAGE) 500 MG tablet, Take 1 tablet by mouth 2 times daily (with meals), Disp: 60 tablet, Rfl: 0  Lab Results   Component Value Date     12/05/2020    K 4.3 12/05/2020     12/05/2020    CO2 25 12/05/2020    BUN 17 12/05/2020    CREATININE 0.56 (L) 12/05/2020    GLUCOSE 153 12/16/2020    CALCIUM 9.4 12/05/2020    PROT 6.3 11/08/2020    LABALBU 3.6 11/08/2020    BILITOT <0.2 11/08/2020    ALKPHOS 104 11/08/2020    AST 53 (H) 11/08/2020    ALT <5 11/08/2020    LABGLOM >60.0 12/05/2020    GFRAA >60.0 12/05/2020    GLOB 2.7 11/08/2020     Lab Results   Component Value Date    WBC 8.0 11/08/2020    HGB 16.4 11/08/2020    HCT 44.9 11/08/2020    MCV 91.5 11/08/2020     11/08/2020     Lab Results   Component Value Date    LABA1C 12.9 (H) 11/08/2020     Lab Results   Component Value Date    HDL 34 (L) 12/05/2020    LDLCALC 149 (H) 12/05/2020    CHOL 229 (H) 12/05/2020    TRIG 228 (H) 12/05/2020     No results found for: TESTM  Lab Results   Component Value Date    TSH 1.780 12/05/2020     No results found for: TPOABS    Review of Systems   Constitutional: Positive for fatigue. Negative for chills and fever. HENT: Negative for congestion, ear pain, postnasal drip, rhinorrhea, sinus pressure and sore throat. Eyes: Positive for blurred vision. Negative for pain and redness. Respiratory: Negative for cough, shortness of breath and wheezing.     Cardiovascular: Negative for chest pain, palpitations and leg swelling. Gastrointestinal: Negative for abdominal pain, diarrhea, nausea and vomiting. Endocrine: Positive for polydipsia and polyuria. Genitourinary: Negative for difficulty urinating. Musculoskeletal: Negative for arthralgias. Skin: Negative for rash. Allergic/Immunologic: Negative for environmental allergies. Neurological: Negative for dizziness and headaches. Hematological: Negative for adenopathy. Psychiatric/Behavioral: Negative for dysphoric mood. Objective:   Physical Exam  Vitals signs reviewed. Constitutional:       Appearance: He is well-developed. HENT:      Head: Normocephalic and atraumatic. Nose: No congestion. Mouth/Throat:      Mouth: Mucous membranes are moist.   Eyes:      Conjunctiva/sclera: Conjunctivae normal.   Neck:      Musculoskeletal: Normal range of motion and neck supple. Cardiovascular:      Rate and Rhythm: Normal rate and regular rhythm. Heart sounds: Normal heart sounds. No murmur. Pulmonary:      Effort: Pulmonary effort is normal.      Breath sounds: Normal breath sounds. Abdominal:      General: Bowel sounds are normal.      Palpations: Abdomen is soft. Musculoskeletal: Normal range of motion. General: No swelling. Skin:     General: Skin is warm and dry. Neurological:      Mental Status: He is alert and oriented to person, place, and time.    Psychiatric:         Mood and Affect: Mood normal.

## 2020-12-23 ENCOUNTER — TELEPHONE (OUTPATIENT)
Dept: ENDOCRINOLOGY | Age: 40
End: 2020-12-23

## 2020-12-23 NOTE — TELEPHONE ENCOUNTER
Patient's wife is calling because Akshat's blood sugar has been running pretty low, it has been running between . He is also feeling  week. They are a little concerned and just wanted to make you aware in case you have also have the same concerns. Please advise.

## 2021-01-13 ENCOUNTER — TELEPHONE (OUTPATIENT)
Dept: ENDOCRINOLOGY | Age: 41
End: 2021-01-13

## 2021-01-13 NOTE — TELEPHONE ENCOUNTER
Patient's wife is calling with questions about Akshat's Trulicity. She states that he is on .75mg and she is not sure if he should stay on that or go up to the 1.5 mg. If he is to stay on the lower dose he would need a new Rx sent to the pharmacy. Please advise.

## 2021-01-13 NOTE — TELEPHONE ENCOUNTER
It appears that I have already sent a prescription for Trulicity 1.5 mg weekly to their pharmacy. If he is having no nausea with the lower dose then he should increase his weekly dosing to the 1.5 mg. Can you please call the patient or his wife to confirm.   Thank you

## 2021-02-05 ENCOUNTER — TELEPHONE (OUTPATIENT)
Dept: DIABETES SERVICES | Age: 41
End: 2021-02-05

## 2021-02-05 NOTE — LETTER
[facility] Diabetes Education Department    Date: 02/05/21       RE: Morteza Etienne 1980    Dear Johanna PRESTON   :    Thank you for referring Morteza Etienne  to diabetes education. Unfortunately, Morteza Etienne, has not kept appointments for education. . 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 He just cannot fit it in right now. He did receive diet information while in hospital and has been able to control his glucose with diet he states. He will call us if he can find time for any education. The referral will remain open one year from the original date. Please encourage your patient to schedule an appointment soon to complete the education sessions by calling our office at [phone]. If you have any questions, please contact our office at [phone].   Thank you for this referral.     Sincerely,    Joseline Valdes RN      Diabetes Education [address]

## 2021-02-05 NOTE — PROGRESS NOTES
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 He just cannot fit it in right now. He did receive diet information while in hospital and has been able to control his glucose with diet he states. He will call us if he can find time for any education. Informed patient referral remains open for one year.

## 2021-03-01 RX ORDER — INSULIN LISPRO 100 [IU]/ML
15 INJECTION, SOLUTION INTRAVENOUS; SUBCUTANEOUS
Qty: 15 ML | Refills: 3 | Status: SHIPPED | OUTPATIENT
Start: 2021-03-01 | End: 2021-03-24 | Stop reason: ALTCHOICE

## 2021-03-09 RX ORDER — DULAGLUTIDE 1.5 MG/.5ML
1.5 INJECTION, SOLUTION SUBCUTANEOUS WEEKLY
Qty: 4 PEN | Refills: 3 | COMMUNITY
Start: 2021-03-09 | End: 2021-03-23

## 2021-03-23 RX ORDER — LANCETS 28 GAUGE
EACH MISCELLANEOUS
Qty: 150 EACH | Refills: 3 | Status: SHIPPED | OUTPATIENT
Start: 2021-03-23 | End: 2022-08-31

## 2021-03-23 RX ORDER — DULAGLUTIDE 1.5 MG/.5ML
INJECTION, SOLUTION SUBCUTANEOUS
Qty: 2 ML | Refills: 3 | Status: SHIPPED | OUTPATIENT
Start: 2021-03-23 | End: 2021-04-19 | Stop reason: SDUPTHER

## 2021-03-23 RX ORDER — BLOOD-GLUCOSE METER
KIT MISCELLANEOUS
Qty: 150 STRIP | Refills: 3 | Status: SHIPPED | OUTPATIENT
Start: 2021-03-23 | End: 2022-05-24 | Stop reason: ALTCHOICE

## 2021-03-24 ENCOUNTER — OFFICE VISIT (OUTPATIENT)
Dept: ENDOCRINOLOGY | Age: 41
End: 2021-03-24
Payer: COMMERCIAL

## 2021-03-24 VITALS
SYSTOLIC BLOOD PRESSURE: 135 MMHG | DIASTOLIC BLOOD PRESSURE: 86 MMHG | WEIGHT: 285 LBS | BODY MASS INDEX: 45.8 KG/M2 | HEIGHT: 66 IN | HEART RATE: 83 BPM

## 2021-03-24 DIAGNOSIS — E11.65 UNCONTROLLED TYPE 2 DIABETES MELLITUS WITH HYPERGLYCEMIA (HCC): Primary | ICD-10-CM

## 2021-03-24 DIAGNOSIS — E11.65 UNCONTROLLED TYPE 2 DIABETES MELLITUS WITH HYPERGLYCEMIA (HCC): ICD-10-CM

## 2021-03-24 DIAGNOSIS — E78.00 HYPERCHOLESTEREMIA: ICD-10-CM

## 2021-03-24 LAB
ALBUMIN SERPL-MCNC: 4.2 G/DL (ref 3.5–4.6)
ALP BLD-CCNC: 71 U/L (ref 35–104)
ALT SERPL-CCNC: 36 U/L (ref 0–41)
ANION GAP SERPL CALCULATED.3IONS-SCNC: 9 MEQ/L (ref 9–15)
AST SERPL-CCNC: 18 U/L (ref 0–40)
BILIRUB SERPL-MCNC: <0.2 MG/DL (ref 0.2–0.7)
BUN BLDV-MCNC: 18 MG/DL (ref 6–20)
CALCIUM SERPL-MCNC: 9.7 MG/DL (ref 8.5–9.9)
CHLORIDE BLD-SCNC: 104 MEQ/L (ref 95–107)
CHP ED QC CHECK: NORMAL
CO2: 24 MEQ/L (ref 20–31)
CREAT SERPL-MCNC: 0.7 MG/DL (ref 0.7–1.2)
GFR AFRICAN AMERICAN: >60
GFR NON-AFRICAN AMERICAN: >60
GLOBULIN: 2.7 G/DL (ref 2.3–3.5)
GLUCOSE BLD-MCNC: 137 MG/DL (ref 70–99)
GLUCOSE BLD-MCNC: 157 MG/DL
HBA1C MFR BLD: 6.7 % (ref 4.8–5.9)
POTASSIUM SERPL-SCNC: 4.3 MEQ/L (ref 3.4–4.9)
SODIUM BLD-SCNC: 137 MEQ/L (ref 135–144)
TOTAL PROTEIN: 6.9 G/DL (ref 6.3–8)

## 2021-03-24 PROCEDURE — 82962 GLUCOSE BLOOD TEST: CPT | Performed by: PHYSICIAN ASSISTANT

## 2021-03-24 PROCEDURE — G8417 CALC BMI ABV UP PARAM F/U: HCPCS | Performed by: PHYSICIAN ASSISTANT

## 2021-03-24 PROCEDURE — G8484 FLU IMMUNIZE NO ADMIN: HCPCS | Performed by: PHYSICIAN ASSISTANT

## 2021-03-24 PROCEDURE — 2022F DILAT RTA XM EVC RTNOPTHY: CPT | Performed by: PHYSICIAN ASSISTANT

## 2021-03-24 PROCEDURE — 99213 OFFICE O/P EST LOW 20 MIN: CPT | Performed by: PHYSICIAN ASSISTANT

## 2021-03-24 PROCEDURE — G8427 DOCREV CUR MEDS BY ELIG CLIN: HCPCS | Performed by: PHYSICIAN ASSISTANT

## 2021-03-24 PROCEDURE — 4004F PT TOBACCO SCREEN RCVD TLK: CPT | Performed by: PHYSICIAN ASSISTANT

## 2021-03-24 PROCEDURE — 3044F HG A1C LEVEL LT 7.0%: CPT | Performed by: PHYSICIAN ASSISTANT

## 2021-03-24 RX ORDER — ROSUVASTATIN CALCIUM 5 MG/1
5 TABLET, COATED ORAL DAILY
Qty: 30 TABLET | Refills: 5 | Status: SHIPPED | OUTPATIENT
Start: 2021-03-24 | End: 2021-09-09 | Stop reason: SDUPTHER

## 2021-03-24 RX ORDER — ATORVASTATIN CALCIUM 20 MG/1
20 TABLET, FILM COATED ORAL DAILY
Qty: 90 TABLET | Refills: 1 | Status: CANCELLED | OUTPATIENT
Start: 2021-03-24

## 2021-03-24 RX ORDER — GLUCOSAMINE HCL/CHONDROITIN SU 500-400 MG
1 CAPSULE ORAL
Qty: 150 STRIP | Refills: 3 | Status: SHIPPED | OUTPATIENT
Start: 2021-03-24 | End: 2022-08-31

## 2021-03-24 RX ORDER — LISINOPRIL 20 MG/1
20 TABLET ORAL DAILY
Qty: 90 TABLET | Refills: 1 | Status: SHIPPED | OUTPATIENT
Start: 2021-03-24 | End: 2022-03-23 | Stop reason: SDUPTHER

## 2021-03-24 ASSESSMENT — ENCOUNTER SYMPTOMS
DIARRHEA: 0
RHINORRHEA: 0
BLURRED VISION: 0
EYE REDNESS: 0
COUGH: 0
SORE THROAT: 0
SINUS PRESSURE: 0
NAUSEA: 0
WHEEZING: 0
EYE PAIN: 0
SHORTNESS OF BREATH: 0
ABDOMINAL PAIN: 0
VOMITING: 0

## 2021-03-24 NOTE — PROGRESS NOTES
3/24/2021    Assessment:       Diagnosis Orders   1. Uncontrolled type 2 diabetes mellitus with hyperglycemia (HCC)  POCT Glucose    Hemoglobin A1C    Comprehensive Metabolic Panel     PLAN:     1. Humalog 8 units three times daily before meals if glucose greater than 180  2. Metformin on hold due to lows   3. Continue Trulicity 1.5 mg weekly   4. Continue lisinopril 20 mg daily   5. Stop atorvastatin 20 mg nightly  6. Start rosuvastatin 5 mg nightly in 2 weeks    7. Freestyle Gautam being used  8. Follow up in 3 months      Orders Placed This Encounter   Procedures    POCT Glucose     No orders of the defined types were placed in this encounter. No follow-ups on file. Subjective:     Chief Complaint   Patient presents with    Diabetes     Vitals:    03/24/21 1353   BP: 135/86   Site: Right Upper Arm   Position: Sitting   Cuff Size: Large Adult   Pulse: 83   Weight: 285 lb (129.3 kg)   Height: 5' 6\" (1.676 m)     Wt Readings from Last 3 Encounters:   03/24/21 285 lb (129.3 kg)   12/16/20 280 lb (127 kg)   11/09/20 287 lb (130.2 kg)     BP Readings from Last 3 Encounters:   03/24/21 135/86   12/16/20 137/86   11/09/20 (!) 158/93     Corrina Cabezas is a 44-year-old male who went to the ER on 11/8/2020 with severe nasal congestion and right ear pain. Laboratory studies revealed severe hyperglycemia with hyponatremia and hyperkalemia. His A1c was 12.9%. The patient denied hospital admission, was kept in the ER for many hours and given IV insulin and 4 L of IV fluid with symptomatic relief. He was discharged home in antibiotics and is here to follow-up with me today regarding his diabetes. 12/16/2020-markedly improved glycemic control, average glucose ranges 120-160. With minimal insulin usage. Diabetes  He presents for his initial diabetic visit. He has type 2 diabetes mellitus. His disease course has been worsening. There are no hypoglycemic associated symptoms.  Pertinent negatives for hypoglycemia include no dizziness or headaches. Pertinent negatives for diabetes include no blurred vision, no chest pain, no fatigue, no polydipsia and no polyuria. There are no hypoglycemic complications. There are no diabetic complications. Risk factors for coronary artery disease include diabetes mellitus, hypertension, male sex, obesity and stress. Current diabetic treatment includes insulin injections. He is compliant with treatment all of the time. He is currently taking insulin pre-breakfast, pre-lunch, pre-dinner and at bedtime. Insulin injections are given by patient. Rotation sites for injection include the abdominal wall and arms. His weight is decreasing steadily. He is following a generally healthy diet. Meal planning includes avoidance of concentrated sweets. He has had a previous visit with a dietitian (Diabetic education ordered). He participates in exercise intermittently. He monitors blood glucose at home 5+ x per day. His overall blood glucose range is >200 mg/dl. An ACE inhibitor/angiotensin II receptor blocker is being taken. He does not see a podiatrist.Eye exam is current. Past Medical History:   Diagnosis Date    Diabetes mellitus ([facility] [address]     Hypertension      History reviewed. No pertinent surgical history.   Social History     Socioeconomic History    Marital status:      Spouse name: Not on file    Number of children: Not on file    Years of education: Not on file    Highest education level: Not on file   Occupational History    Not on file   Social Needs    Financial resource strain: Not on file    Food insecurity     Worry: Not on file     Inability: Not on file    Transportation needs     Medical: Not on file     Non-medical: Not on file   Tobacco Use    Smoking status: Current Every Day Smoker    Smokeless tobacco: Never Used   Substance and Sexual Activity    Alcohol use: Not on file    Drug use: Not on file    Sexual activity: Not on file   Lifestyle    Physical activity     Days 12/05/2020    K 4.3 12/05/2020     12/05/2020    CO2 25 12/05/2020    BUN 17 12/05/2020    CREATININE 0.56 (L) 12/05/2020    GLUCOSE 157 03/24/2021    CALCIUM 9.4 12/05/2020    PROT 6.3 11/08/2020    LABALBU 3.6 11/08/2020    BILITOT <0.2 11/08/2020    ALKPHOS 104 11/08/2020    AST 53 (H) 11/08/2020    ALT <5 11/08/2020    LABGLOM >60.0 12/05/2020    GFRAA >60.0 12/05/2020    GLOB 2.7 11/08/2020     Lab Results   Component Value Date    WBC 8.0 11/08/2020    HGB 16.4 11/08/2020    HCT 44.9 11/08/2020    MCV 91.5 11/08/2020     11/08/2020     Lab Results   Component Value Date    LABA1C 6.7 (H) 03/24/2021    LABA1C 12.9 (H) 11/08/2020     Lab Results   Component Value Date    HDL 34 (L) 12/05/2020    LDLCALC 149 (H) 12/05/2020    CHOL 229 (H) 12/05/2020    TRIG 228 (H) 12/05/2020     No results found for: TESTM  Lab Results   Component Value Date    TSH 1.780 12/05/2020     No results found for: TPOABS    Review of Systems   Constitutional: Negative for chills, fatigue and fever. HENT: Negative for congestion, ear pain, postnasal drip, rhinorrhea, sinus pressure and sore throat. Eyes: Negative for blurred vision, pain and redness. Respiratory: Negative for cough, shortness of breath and wheezing. Cardiovascular: Negative for chest pain, palpitations and leg swelling. Gastrointestinal: Negative for abdominal pain, diarrhea, nausea and vomiting. Endocrine: Negative for polydipsia and polyuria. Genitourinary: Negative for difficulty urinating. Musculoskeletal: Negative for arthralgias. Skin: Negative for rash. Allergic/Immunologic: Negative for environmental allergies. Neurological: Negative for dizziness and headaches. Hematological: Negative for adenopathy. Psychiatric/Behavioral: Negative for dysphoric mood. Objective:   Physical Exam  Vitals signs reviewed. Constitutional:       Appearance: He is well-developed. HENT:      Head: Normocephalic and atraumatic. Nose: No congestion. Mouth/Throat:      Mouth: Mucous membranes are moist.   Eyes:      Conjunctiva/sclera: Conjunctivae normal.   Neck:      Musculoskeletal: Normal range of motion and neck supple. Cardiovascular:      Rate and Rhythm: Normal rate and regular rhythm. Heart sounds: Normal heart sounds. No murmur. Pulmonary:      Effort: Pulmonary effort is normal.      Breath sounds: Normal breath sounds. Abdominal:      General: Bowel sounds are normal.      Palpations: Abdomen is soft. Musculoskeletal: Normal range of motion. General: No swelling. Skin:     General: Skin is warm and dry. Neurological:      Mental Status: He is alert and oriented to person, place, and time.    Psychiatric:         Mood and Affect: Mood normal.

## 2021-04-19 ENCOUNTER — TELEPHONE (OUTPATIENT)
Dept: ENDOCRINOLOGY | Age: 41
End: 2021-04-19

## 2021-04-19 RX ORDER — DULAGLUTIDE 1.5 MG/.5ML
INJECTION, SOLUTION SUBCUTANEOUS
Qty: 2 ML | Refills: 3 | Status: SHIPPED | OUTPATIENT
Start: 2021-04-19 | End: 2021-07-12 | Stop reason: ALTCHOICE

## 2021-05-17 RX ORDER — LANCETS
1 EACH MISCELLANEOUS DAILY
Qty: 100 EACH | Refills: 3 | Status: SHIPPED | OUTPATIENT
Start: 2021-05-17

## 2021-05-17 RX ORDER — BLOOD-GLUCOSE METER
1 EACH MISCELLANEOUS DAILY
Qty: 1 KIT | Refills: 0 | Status: SHIPPED | OUTPATIENT
Start: 2021-05-17

## 2021-05-17 RX ORDER — BLOOD SUGAR DIAGNOSTIC
1 STRIP MISCELLANEOUS DAILY
Qty: 100 EACH | Refills: 3 | Status: SHIPPED | OUTPATIENT
Start: 2021-05-17 | End: 2022-03-23 | Stop reason: SDUPTHER

## 2021-05-17 RX ORDER — INSULIN GLARGINE 100 [IU]/ML
30 INJECTION, SOLUTION SUBCUTANEOUS NIGHTLY
Qty: 15 ML | Refills: 3 | OUTPATIENT
Start: 2021-05-17

## 2021-05-17 NOTE — TELEPHONE ENCOUNTER
Pharmacy requesting medication refill. Please approve or deny this request.    Rx requested:  Requested Prescriptions     Pending Prescriptions Disp Refills    Blood Glucose Monitoring Suppl (ONE TOUCH ULTRA 2) w/Device KIT 1 kit 0     Si kit by Does not apply route daily    ONE TOUCH ULTRASOFT LANCETS MISC 100 each 3     Si each by Does not apply route daily    blood glucose test strips (ONETOUCH ULTRA) strip 100 each 3     Si each by In Vitro route daily As needed.          Last Office Visit:   3/24/2021      Next Visit Date:  Future Appointments   Date Time Provider Susu Li   2021  3:20 PM Cassidy Hernandez, [address]

## 2021-06-23 NOTE — TELEPHONE ENCOUNTER
Patient requesting medication refill.  Please approve or deny this request.    Rx requested:  Requested Prescriptions     Pending Prescriptions Disp Refills    Continuous Blood Gluc  (FREESTYLE JEFF 2 READER) ANABEL 1 each 0     Si each by Does not apply route daily    Continuous Blood Gluc Sensor (FREESTYLE JEFF 2 SENSOR) MISC 2 each 2     Si each by Does not apply route every 14 days         Last Office Visit:   3/24/2021      Next Visit Date:  Future Appointments   Date Time Provider Susu Li   2021  2:20 PM Tu Spence, [address]

## 2021-06-28 RX ORDER — FLASH GLUCOSE SCANNING READER
1 EACH MISCELLANEOUS DAILY
Qty: 1 EACH | Refills: 0 | Status: SHIPPED | OUTPATIENT
Start: 2021-06-28 | End: 2021-07-12 | Stop reason: ALTCHOICE

## 2021-06-28 RX ORDER — FLASH GLUCOSE SENSOR
1 KIT MISCELLANEOUS
Qty: 2 EACH | Refills: 2 | Status: SHIPPED | OUTPATIENT
Start: 2021-06-28 | End: 2022-02-28 | Stop reason: SDUPTHER

## 2021-07-12 ENCOUNTER — OFFICE VISIT (OUTPATIENT)
Dept: ENDOCRINOLOGY | Age: 41
End: 2021-07-12
Payer: COMMERCIAL

## 2021-07-12 VITALS
DIASTOLIC BLOOD PRESSURE: 89 MMHG | SYSTOLIC BLOOD PRESSURE: 144 MMHG | HEIGHT: 66 IN | BODY MASS INDEX: 45 KG/M2 | WEIGHT: 280 LBS | OXYGEN SATURATION: 93 % | HEART RATE: 96 BPM

## 2021-07-12 DIAGNOSIS — E11.65 UNCONTROLLED TYPE 2 DIABETES MELLITUS WITH HYPERGLYCEMIA (HCC): Primary | ICD-10-CM

## 2021-07-12 LAB
CHP ED QC CHECK: NORMAL
GLUCOSE BLD-MCNC: 130 MG/DL
HBA1C MFR BLD: 6.6 %

## 2021-07-12 PROCEDURE — G8427 DOCREV CUR MEDS BY ELIG CLIN: HCPCS | Performed by: PHYSICIAN ASSISTANT

## 2021-07-12 PROCEDURE — 99213 OFFICE O/P EST LOW 20 MIN: CPT | Performed by: PHYSICIAN ASSISTANT

## 2021-07-12 PROCEDURE — 82962 GLUCOSE BLOOD TEST: CPT | Performed by: PHYSICIAN ASSISTANT

## 2021-07-12 PROCEDURE — 3044F HG A1C LEVEL LT 7.0%: CPT | Performed by: PHYSICIAN ASSISTANT

## 2021-07-12 PROCEDURE — G8417 CALC BMI ABV UP PARAM F/U: HCPCS | Performed by: PHYSICIAN ASSISTANT

## 2021-07-12 PROCEDURE — 4004F PT TOBACCO SCREEN RCVD TLK: CPT | Performed by: PHYSICIAN ASSISTANT

## 2021-07-12 PROCEDURE — 83036 HEMOGLOBIN GLYCOSYLATED A1C: CPT | Performed by: PHYSICIAN ASSISTANT

## 2021-07-12 PROCEDURE — 2022F DILAT RTA XM EVC RTNOPTHY: CPT | Performed by: PHYSICIAN ASSISTANT

## 2021-07-12 RX ORDER — PEN NEEDLE, DIABETIC 31 G X1/4"
NEEDLE, DISPOSABLE MISCELLANEOUS
COMMUNITY
Start: 2021-06-12 | End: 2022-08-19 | Stop reason: SDUPTHER

## 2021-07-12 RX ORDER — DULAGLUTIDE 3 MG/.5ML
3 INJECTION, SOLUTION SUBCUTANEOUS WEEKLY
Qty: 4 PEN | Refills: 3 | Status: SHIPPED | OUTPATIENT
Start: 2021-07-12 | End: 2021-12-13 | Stop reason: SDUPTHER

## 2021-07-12 ASSESSMENT — ENCOUNTER SYMPTOMS
DIARRHEA: 0
ABDOMINAL PAIN: 0
RHINORRHEA: 0
VOMITING: 0
SINUS PRESSURE: 0
EYE PAIN: 0
COUGH: 0
SHORTNESS OF BREATH: 0
SORE THROAT: 0
WHEEZING: 0
EYE REDNESS: 0
BLURRED VISION: 0
NAUSEA: 0

## 2021-07-12 NOTE — PATIENT INSTRUCTIONS
Endocrinology-diabetes    1. Check your blood sugars 4 times a day, before meals and at night  2. Call our office if you have blood sugars less than 80 or greater then 200 on two or more occasions  3. Call our office if you have any questions regarding your blood sugars or insulin dosing regiment  4. Signs of low blood sugar include sweating , heart racing, dizziness and weakness. Check your blood sugar if you have any of these symptoms. Medications-  5. Increase Trulicity 3.0 mg weekly    6. Continue lisinopril 20 mg daily   7. Continue rosuvastatin 5 mg nightly    8. Freestyle Gautam 2 being used  9.  Follow up in 6 months

## 2021-07-12 NOTE — PROGRESS NOTES
7/12/2021    Assessment:       Diagnosis Orders   1. Uncontrolled type 2 diabetes mellitus with hyperglycemia (HCC)  POCT Glucose    POCT glycosylated hemoglobin (Hb A1C)    Comprehensive Metabolic Panel    Hemoglobin A1C     DIABETES FOOT EXAM     PLAN:     1. Increase Trulicity 3.0 mg weekly    2. Continue lisinopril 20 mg daily   3. Continue rosuvastatin 5 mg nightly    4. Freestyle Gautam 2 being used  5. Follow up in 6 months    Orders Placed This Encounter   Procedures    Comprehensive Metabolic Panel     Standing Status:   Future     Standing Expiration Date:   7/12/2022    Hemoglobin A1C     Standing Status:   Future     Standing Expiration Date:   7/12/2022    POCT Glucose    POCT glycosylated hemoglobin (Hb A1C)     DIABETES FOOT EXAM     Orders Placed This Encounter   Medications    Dulaglutide (TRULICITY) 3 MS/4.3PK SOPN     Sig: Inject 3 mg into the skin once a week     Dispense:  4 pen     Refill:  3     Return in about 6 months (around 1/12/2022) for Diabetes. Subjective:     Chief Complaint   Patient presents with    Diabetes     Vitals:    07/12/21 1209 07/12/21 1211   BP: (!) 158/92 (!) 144/89   Pulse: 96    SpO2: 93%    Weight: 280 lb (127 kg)    Height: 5' 6\" (1.676 m)      Wt Readings from Last 3 Encounters:   07/12/21 280 lb (127 kg)   03/24/21 285 lb (129.3 kg)   12/16/20 280 lb (127 kg)     BP Readings from Last 3 Encounters:   07/12/21 (!) 144/89   03/24/21 135/86   12/16/20 137/86     Jeyson Noguera is a 49-year-old male who went to the ER on 11/8/2020 with severe nasal congestion and right ear pain. Laboratory studies revealed severe hyperglycemia with hyponatremia and hyperkalemia. His A1c was 12.9%. The patient denied hospital admission, was kept in the ER for many hours and given IV insulin and 4 L of IV fluid with symptomatic relief. He was discharged home in antibiotics and is here to follow-up with me today regarding his diabetes.     Diabetes  He presents for his initial Social History Narrative    Not on file     Social Determinants of Health     Financial Resource Strain:     Difficulty of Paying Living Expenses:    Food Insecurity:     Worried About Running Out of Food in the Last Year:     [address] in the Last Year:    Transportation Needs:     Lack of Transportation (Medical):  Lack of Transportation (Non-Medical):    Physical Activity:     Days of Exercise per Week:     Minutes of Exercise per Session:    Stress:     Feeling of Stress :    Social Connections:     Frequency of Communication with Friends and Family:     Frequency of Social Gatherings with Friends and Family:     Attends [employer] Services:     Active Member of Clubs or Organizations:     Attends Club or Organization Meetings:     Marital Status:    Intimate Partner Violence:     Fear of Current or Ex-Partner:     Emotionally Abused:     Physically Abused:     Sexually Abused:      No family history on file. Allergies   Allergen Reactions    Atorvastatin Other (See Comments)     myalgias       Current Outpatient Medications:     Dulaglutide (TRULICITY) 3 YE/5.6WN SOPN, Inject 3 mg into the skin once a week, Disp: 4 pen, Rfl: 3    Continuous Blood Gluc Sensor (FREESTYLE JEFF 2 SENSOR) MISC, 1 each by Does not apply route every 14 days, Disp: 2 each, Rfl: 2    Blood Glucose Monitoring Suppl (ONE TOUCH ULTRA 2) w/Device KIT, 1 kit by Does not apply route daily, Disp: 1 kit, Rfl: 0    ONE TOUCH ULTRASOFT LANCETS MISC, 1 each by Does not apply route daily, Disp: 100 each, Rfl: 3    blood glucose test strips (ONETOUCH ULTRA) strip, 1 each by In Vitro route daily As needed. , Disp: 100 each, Rfl: 3    lisinopril (PRINIVIL;ZESTRIL) 20 MG tablet, Take 1 tablet by mouth daily, Disp: 90 tablet, Rfl: 1    blood glucose monitor strips, 1 strip by Other route 4 times daily (before meals and nightly) Pt test 4x daily Dx E11.65.   May substitute for generic or insurance covered product, Disp: 150 strip, Rfl: 3    blood glucose monitor kit and supplies, 1 kit by Other route 4 times daily (before meals and nightly) Pt test 4x daily Dx E11.65.   May substitute for generic or insurance covered product, Disp: 1 kit, Rfl: 0    rosuvastatin (CRESTOR) 5 MG tablet, Take 1 tablet by mouth daily, Disp: 30 tablet, Rfl: 5    FREESTYLE LITE strip, Use 4 times daily (before meals and nightly), Disp: 150 strip, Rfl: 3    Continuous Blood Gluc Sensor (FREESTYLE JEFF 2 SENSOR SYSTM) MISC, Use continuously as directed, Disp: 6 each, Rfl: 3    Cholecalciferol (VITAMIN D3) 25 MCG (1000 UT) CAPS, Take by mouth 4 daily, Disp: , Rfl:     busPIRone (BUSPAR) 5 MG tablet, Take 5 mg by mouth 2 times daily, Disp: , Rfl:     [facility] UNIFINE PENTIPS 31G X 6 MM MISC, Use 4 times daily (before meals and nightly), Disp: , Rfl:     FreeStyle Lancets MISC, Use 4 times daily (before meals and nightly) (Patient not taking: Reported on 7/12/2021), Disp: 150 each, Rfl: 3  Lab Results   Component Value Date     03/24/2021    K 4.3 03/24/2021     03/24/2021    CO2 24 03/24/2021    BUN 18 03/24/2021    CREATININE 0.70 03/24/2021    GLUCOSE 130 07/12/2021    CALCIUM 9.7 03/24/2021    PROT 6.9 03/24/2021    LABALBU 4.2 03/24/2021    BILITOT <0.2 03/24/2021    ALKPHOS 71 03/24/2021    AST 18 03/24/2021    ALT 36 03/24/2021    LABGLOM >60.0 03/24/2021    GFRAA >60.0 03/24/2021    GLOB 2.7 03/24/2021     Lab Results   Component Value Date    WBC 8.0 11/08/2020    HGB 16.4 11/08/2020    HCT 44.9 11/08/2020    MCV 91.5 11/08/2020     11/08/2020     Lab Results   Component Value Date    LABA1C 6.6 07/12/2021    LABA1C 6.7 (H) 03/24/2021    LABA1C 12.9 (H) 11/08/2020     Lab Results   Component Value Date    HDL 34 (L) 12/05/2020    LDLCALC 149 (H) 12/05/2020    CHOL 229 (H) 12/05/2020    TRIG 228 (H) 12/05/2020     No results found for: TESTM  Lab Results   Component Value Date    TSH 1.780 12/05/2020     No results found for: TPOABS    Review of Systems   Constitutional: Negative for chills, fatigue and fever. HENT: Negative for congestion, ear pain, postnasal drip, rhinorrhea, sinus pressure and sore throat. Eyes: Negative for blurred vision, pain and redness. Respiratory: Negative for cough, shortness of breath and wheezing. Cardiovascular: Negative for chest pain, palpitations and leg swelling. Gastrointestinal: Negative for abdominal pain, diarrhea, nausea and vomiting. Endocrine: Negative for polydipsia and polyuria. Genitourinary: Negative for difficulty urinating. Musculoskeletal: Negative for arthralgias. Skin: Negative for rash. Allergic/Immunologic: Negative for environmental allergies. Neurological: Negative for dizziness and headaches. Hematological: Negative for adenopathy. Psychiatric/Behavioral: Negative for dysphoric mood. Objective:   Physical Exam  Vitals reviewed. Constitutional:       Appearance: He is well-developed. HENT:      Head: Normocephalic and atraumatic. Nose: No congestion. Mouth/Throat:      Mouth: Mucous membranes are moist.   Eyes:      Conjunctiva/sclera: Conjunctivae normal.   Cardiovascular:      Rate and Rhythm: Normal rate and regular rhythm. Heart sounds: Normal heart sounds. No murmur heard. Pulmonary:      Effort: Pulmonary effort is normal.      Breath sounds: Normal breath sounds. Abdominal:      General: Bowel sounds are normal.      Palpations: Abdomen is soft. Musculoskeletal:         General: No swelling. Normal range of motion. Cervical back: Normal range of motion and neck supple. Skin:     General: Skin is warm and dry. Neurological:      Mental Status: He is alert and oriented to person, place, and time.    Psychiatric:         Mood and Affect: Mood normal.

## 2021-09-09 RX ORDER — ROSUVASTATIN CALCIUM 5 MG/1
5 TABLET, COATED ORAL DAILY
Qty: 30 TABLET | Refills: 5 | Status: SHIPPED | OUTPATIENT
Start: 2021-09-09 | End: 2022-02-03

## 2021-09-09 NOTE — TELEPHONE ENCOUNTER
Pharmacy requesting medication refill.  Please approve or deny this request.    Rx requested:  Requested Prescriptions     Pending Prescriptions Disp Refills    rosuvastatin (CRESTOR) 5 MG tablet 30 tablet 5     Sig: Take 1 tablet by mouth daily         Last Office Visit:   7/12/2021      Next Visit Date:  Future Appointments   Date Time Provider Susu Li   1/10/2022 11:00 AM Russell Screen, [address]

## 2021-12-13 RX ORDER — DULAGLUTIDE 3 MG/.5ML
3 INJECTION, SOLUTION SUBCUTANEOUS WEEKLY
Qty: 4 PEN | Refills: 3 | Status: SHIPPED | OUTPATIENT
Start: 2021-12-13 | End: 2022-03-04

## 2021-12-13 NOTE — TELEPHONE ENCOUNTER
patient requesting medication refill.  Please approve or deny this request.    Rx requested:  Requested Prescriptions     Pending Prescriptions Disp Refills    Dulaglutide (TRULICITY) 3 NZ/6.8YX SOPN 4 pen 3     Sig: Inject 3 mg into the skin once a week         Last Office Visit:   7/12/2021      Next Visit Date:  Future Appointments   Date Time Provider Susu Li   1/10/2022 11:00 AM Anaid Ruiz, [address]

## 2022-02-04 DIAGNOSIS — E11.65 UNCONTROLLED TYPE 2 DIABETES MELLITUS WITH HYPERGLYCEMIA (HCC): Primary | ICD-10-CM

## 2022-02-04 RX ORDER — ROSUVASTATIN CALCIUM 5 MG/1
5 TABLET, COATED ORAL DAILY
Qty: 30 TABLET | Refills: 5 | Status: SHIPPED | OUTPATIENT
Start: 2022-02-04 | End: 2022-03-23 | Stop reason: SINTOL

## 2022-02-28 RX ORDER — FLASH GLUCOSE SENSOR
1 KIT MISCELLANEOUS
Qty: 2 EACH | Refills: 2 | Status: SHIPPED | OUTPATIENT
Start: 2022-02-28 | End: 2022-04-27

## 2022-02-28 NOTE — TELEPHONE ENCOUNTER
pharmacy requesting medication refill. Please approve or deny this request.    Rx requested:  Requested Prescriptions     Pending Prescriptions Disp Refills    Continuous Blood Gluc Sensor (FREESTYLE JEFF 2 SENSOR) MISC 2 each 2     Si each by Does not apply route every 14 days         Last Office Visit:   2021      Next Visit Date:  No future appointments.

## 2022-03-04 RX ORDER — DULAGLUTIDE 3 MG/.5ML
INJECTION, SOLUTION SUBCUTANEOUS
Qty: 2 ML | Refills: 0 | Status: SHIPPED | OUTPATIENT
Start: 2022-03-04 | End: 2022-03-23 | Stop reason: ALTCHOICE

## 2022-03-23 ENCOUNTER — OFFICE VISIT (OUTPATIENT)
Dept: ENDOCRINOLOGY | Age: 42
End: 2022-03-23
Payer: COMMERCIAL

## 2022-03-23 VITALS
DIASTOLIC BLOOD PRESSURE: 76 MMHG | HEART RATE: 90 BPM | BODY MASS INDEX: 45.64 KG/M2 | SYSTOLIC BLOOD PRESSURE: 120 MMHG | WEIGHT: 284 LBS | HEIGHT: 66 IN

## 2022-03-23 DIAGNOSIS — E11.65 UNCONTROLLED TYPE 2 DIABETES MELLITUS WITH HYPERGLYCEMIA (HCC): Primary | ICD-10-CM

## 2022-03-23 DIAGNOSIS — E11.65 UNCONTROLLED TYPE 2 DIABETES MELLITUS WITH HYPERGLYCEMIA (HCC): ICD-10-CM

## 2022-03-23 LAB
ALBUMIN SERPL-MCNC: 4.4 G/DL (ref 3.5–4.6)
ALP BLD-CCNC: 60 U/L (ref 35–104)
ALT SERPL-CCNC: 39 U/L (ref 0–41)
ANION GAP SERPL CALCULATED.3IONS-SCNC: 10 MEQ/L (ref 9–15)
AST SERPL-CCNC: 20 U/L (ref 0–40)
BILIRUB SERPL-MCNC: <0.2 MG/DL (ref 0.2–0.7)
BUN BLDV-MCNC: 17 MG/DL (ref 6–20)
CALCIUM SERPL-MCNC: 9.7 MG/DL (ref 8.5–9.9)
CHLORIDE BLD-SCNC: 103 MEQ/L (ref 95–107)
CHP ED QC CHECK: NORMAL
CO2: 25 MEQ/L (ref 20–31)
CREAT SERPL-MCNC: 0.71 MG/DL (ref 0.7–1.2)
CREATININE URINE: 172 MG/DL
GFR AFRICAN AMERICAN: >60
GFR NON-AFRICAN AMERICAN: >60
GLOBULIN: 2.4 G/DL (ref 2.3–3.5)
GLUCOSE BLD-MCNC: 112 MG/DL (ref 70–99)
GLUCOSE BLD-MCNC: 155 MG/DL
HBA1C MFR BLD: 6.9 %
HCT VFR BLD CALC: 47.4 % (ref 42–52)
HEMOGLOBIN: 16.4 G/DL (ref 14–18)
MCH RBC QN AUTO: 31.1 PG (ref 27–31.3)
MCHC RBC AUTO-ENTMCNC: 34.6 % (ref 33–37)
MCV RBC AUTO: 90 FL (ref 80–100)
MICROALBUMIN UR-MCNC: <1.2 MG/DL
MICROALBUMIN/CREAT UR-RTO: NORMAL MG/G (ref 0–30)
PDW BLD-RTO: 13.6 % (ref 11.5–14.5)
PLATELET # BLD: 235 K/UL (ref 130–400)
POTASSIUM SERPL-SCNC: 3.8 MEQ/L (ref 3.4–4.9)
RBC # BLD: 5.27 M/UL (ref 4.7–6.1)
SODIUM BLD-SCNC: 138 MEQ/L (ref 135–144)
TOTAL PROTEIN: 6.8 G/DL (ref 6.3–8)
WBC # BLD: 7.8 K/UL (ref 4.8–10.8)

## 2022-03-23 PROCEDURE — 83036 HEMOGLOBIN GLYCOSYLATED A1C: CPT | Performed by: PHYSICIAN ASSISTANT

## 2022-03-23 PROCEDURE — 99213 OFFICE O/P EST LOW 20 MIN: CPT | Performed by: PHYSICIAN ASSISTANT

## 2022-03-23 PROCEDURE — G8417 CALC BMI ABV UP PARAM F/U: HCPCS | Performed by: PHYSICIAN ASSISTANT

## 2022-03-23 PROCEDURE — 2022F DILAT RTA XM EVC RTNOPTHY: CPT | Performed by: PHYSICIAN ASSISTANT

## 2022-03-23 PROCEDURE — 4004F PT TOBACCO SCREEN RCVD TLK: CPT | Performed by: PHYSICIAN ASSISTANT

## 2022-03-23 PROCEDURE — 3044F HG A1C LEVEL LT 7.0%: CPT | Performed by: PHYSICIAN ASSISTANT

## 2022-03-23 PROCEDURE — G8427 DOCREV CUR MEDS BY ELIG CLIN: HCPCS | Performed by: PHYSICIAN ASSISTANT

## 2022-03-23 PROCEDURE — G8484 FLU IMMUNIZE NO ADMIN: HCPCS | Performed by: PHYSICIAN ASSISTANT

## 2022-03-23 PROCEDURE — 82962 GLUCOSE BLOOD TEST: CPT | Performed by: PHYSICIAN ASSISTANT

## 2022-03-23 RX ORDER — DULAGLUTIDE 4.5 MG/.5ML
4.5 INJECTION, SOLUTION SUBCUTANEOUS WEEKLY
Qty: 4 PEN | Refills: 3 | Status: SHIPPED | OUTPATIENT
Start: 2022-03-23 | End: 2022-06-21

## 2022-03-23 RX ORDER — ROSUVASTATIN CALCIUM 5 MG/1
5 TABLET, COATED ORAL DAILY
Qty: 90 TABLET | Refills: 1 | Status: CANCELLED | OUTPATIENT
Start: 2022-03-23

## 2022-03-23 RX ORDER — LISINOPRIL 20 MG/1
20 TABLET ORAL DAILY
Qty: 90 TABLET | Refills: 1 | Status: SHIPPED | OUTPATIENT
Start: 2022-03-23

## 2022-03-23 RX ORDER — DULAGLUTIDE 3 MG/.5ML
INJECTION, SOLUTION SUBCUTANEOUS
Qty: 6 ML | Refills: 1 | Status: SHIPPED | OUTPATIENT
Start: 2022-03-23 | End: 2022-08-31

## 2022-03-23 RX ORDER — ICOSAPENT ETHYL 1000 MG/1
2 CAPSULE ORAL 2 TIMES DAILY
Qty: 60 CAPSULE | Refills: 3 | Status: SHIPPED | OUTPATIENT
Start: 2022-03-23

## 2022-03-23 RX ORDER — BLOOD SUGAR DIAGNOSTIC
STRIP MISCELLANEOUS
Qty: 300 EACH | Refills: 1 | Status: SHIPPED | OUTPATIENT
Start: 2022-03-23 | End: 2022-05-24

## 2022-03-23 ASSESSMENT — ENCOUNTER SYMPTOMS
ABDOMINAL PAIN: 0
DIARRHEA: 0
NAUSEA: 0
BLURRED VISION: 0
WHEEZING: 0
RHINORRHEA: 0
SORE THROAT: 0
VOMITING: 0
EYE REDNESS: 0
COUGH: 0
SINUS PRESSURE: 0
EYE PAIN: 0
SHORTNESS OF BREATH: 0

## 2022-03-23 NOTE — PROGRESS NOTES
3/23/2022    Assessment:       Diagnosis Orders   1. Uncontrolled type 2 diabetes mellitus with hyperglycemia (HCC)  POCT Glucose    POCT glycosylated hemoglobin (Hb A1C)         PLAN:     1. Increase Trulicity 4.5 mg weekly    2. Continue lisinopril 20 mg daily   3. Stop rosuvastatin 5 mg nightly   4. Will order Nexlizet 180-10 mg daily ([facility])  5. Vascepa 2 capsule twice a day    6. Freestyle Gautam 2 being used  7. Follow up in 6 months    Orders Placed This Encounter   Procedures    POCT Glucose    POCT glycosylated hemoglobin (Hb A1C)     No orders of the defined types were placed in this encounter. No follow-ups on file. Subjective:     Chief Complaint   Patient presents with    Diabetes     Vitals:    03/23/22 1516   BP: 120/76   Site: Right Upper Arm   Position: Sitting   Cuff Size: Large Adult   Pulse: 90   Weight: 284 lb (128.8 kg)   Height: 5' 6\" (1.676 m)     Wt Readings from Last 3 Encounters:   03/23/22 284 lb (128.8 kg)   07/12/21 280 lb (127 kg)   03/24/21 285 lb (129.3 kg)     BP Readings from Last 3 Encounters:   03/23/22 120/76   07/12/21 (!) 144/89   03/24/21 135/86     Sharita Plummer is a 61-year-old male who went to the ER on 11/8/2020 with severe nasal congestion and right ear pain. Laboratory studies revealed severe hyperglycemia with hyponatremia and hyperkalemia. His A1c was 12.9%. The patient denied hospital admission, was kept in the ER for many hours and given IV insulin and 4 L of IV fluid with symptomatic relief. He was discharged home in antibiotics and is here to follow-up with me today regarding his diabetes. Diabetes  He presents for his initial diabetic visit. He has type 2 diabetes mellitus. His disease course has been worsening. There are no hypoglycemic associated symptoms. Pertinent negatives for hypoglycemia include no dizziness or headaches.  Pertinent negatives for diabetes include no blurred vision, no chest pain, no fatigue, no polydipsia and no polyuria. There are no hypoglycemic complications. There are no diabetic complications. Risk factors for coronary artery disease include diabetes mellitus, hypertension, male sex, obesity and stress. Current diabetic treatment includes insulin injections. He is compliant with treatment all of the time. He is currently taking insulin pre-breakfast, pre-lunch, pre-dinner and at bedtime. Insulin injections are given by patient. Rotation sites for injection include the abdominal wall and arms. His weight is decreasing steadily. He is following a generally healthy diet. Meal planning includes avoidance of concentrated sweets. He has had a previous visit with a dietitian (Diabetic education ordered). He participates in exercise intermittently. He monitors blood glucose at home 5+ x per day. His overall blood glucose range is >200 mg/dl. An ACE inhibitor/angiotensin II receptor blocker is being taken. He does not see a podiatrist.Eye exam is current. Past Medical History:   Diagnosis Date    Diabetes mellitus ([facility] [address]     Hypertension      No past surgical history on file.   Social History     Socioeconomic History    Marital status:      Spouse name: Not on file    Number of children: Not on file    Years of education: Not on file    Highest education level: Not on file   Occupational History    Not on file   Tobacco Use    Smoking status: Current Every Day Smoker    Smokeless tobacco: Never Used   Vaping Use    Vaping Use: Never used   Substance and Sexual Activity    Alcohol use: Not on file    Drug use: Not on file    Sexual activity: Not on file   Other Topics Concern    Not on file   Social History Narrative    Not on file     Social Determinants of Health     Financial Resource Strain:     Difficulty of Paying Living Expenses: Not on file   Food Insecurity:     Worried About Running Out of Food in the Last Year: Not on file    Zohra of Food in the Last Year: Not on HStacy Meade Needs:     Lack of Transportation (Medical): Not on file    Lack of Transportation (Non-Medical): Not on file   Physical Activity:     Days of Exercise per Week: Not on file    Minutes of Exercise per Session: Not on file   Stress:     Feeling of Stress : Not on file   Social Connections:     Frequency of Communication with Friends and Family: Not on file    Frequency of Social Gatherings with Friends and Family: Not on file    Attends Zoroastrian Services: Not on file    Active Member of [address] [place] [address] or Organizations: Not on file    Attends Club or Organization Meetings: Not on file    Marital Status: Not on file   Intimate Partner Violence:     Fear of Current or Ex-Partner: Not on file    Emotionally Abused: Not on file    Physically Abused: Not on file    Sexually Abused: Not on file   Housing Stability:     Unable to Pay for Housing in the Last Year: Not on file    Number of [place] in the Last Year: Not on file    Unstable Housing in the Last Year: Not on file     No family history on file. Allergies   Allergen Reactions    Atorvastatin Other (See Comments)     myalgias       Current Outpatient Medications:     TRULICITY 3 GC/1.5EI SOPN, INJECT 1 (ONE) PEN INTO THE SKIN ONCE A WEEK, Disp: 2 mL, Rfl: 0    Continuous Blood Gluc Sensor (FREESTYLE JEFF 2 SENSOR) MISC, 1 each by Does not apply route every 14 days, Disp: 2 each, Rfl: 2    rosuvastatin (CRESTOR) 5 MG tablet, Take 1 tablet by mouth daily, Disp: 30 tablet, Rfl: 5    [facility] [facility] UNIFINE PENTIPS 31G X 6 MM MISC, Use 4 times daily (before meals and nightly), Disp: , Rfl:     Blood Glucose Monitoring Suppl (ONE TOUCH ULTRA 2) w/Device KIT, 1 kit by Does not apply route daily, Disp: 1 kit, Rfl: 0    ONE TOUCH ULTRASOFT LANCETS MISC, 1 each by Does not apply route daily, Disp: 100 each, Rfl: 3    blood glucose test strips (ONETOUCH ULTRA) strip, 1 each by In Vitro route daily As needed. , Disp: 100 each, Rfl: 3    lisinopril (PRINIVIL;ZESTRIL) 20 MG tablet, Take 1 tablet by mouth daily, Disp: 90 tablet, Rfl: 1    blood glucose monitor strips, 1 strip by Other route 4 times daily (before meals and nightly) Pt test 4x daily Dx E11.65. May substitute for generic or insurance covered product, Disp: 150 strip, Rfl: 3    blood glucose monitor kit and supplies, 1 kit by Other route 4 times daily (before meals and nightly) Pt test 4x daily Dx E11.65.   May substitute for generic or insurance covered product, Disp: 1 kit, Rfl: 0    FreeStyle Lancets MISC, Use 4 times daily (before meals and nightly), Disp: 150 each, Rfl: 3    FREESTYLE LITE strip, Use 4 times daily (before meals and nightly), Disp: 150 strip, Rfl: 3    Continuous Blood Gluc Sensor (FREESTYLE JEFF 2 SENSOR SYSTM) MISC, Use continuously as directed, Disp: 6 each, Rfl: 3    Cholecalciferol (VITAMIN D3) 25 MCG (1000 UT) CAPS, Take by mouth 4 daily, Disp: , Rfl:     busPIRone (BUSPAR) 5 MG tablet, Take 5 mg by mouth 2 times daily, Disp: , Rfl:   Lab Results   Component Value Date     03/24/2021    K 4.3 03/24/2021     03/24/2021    CO2 24 03/24/2021    BUN 18 03/24/2021    CREATININE 0.70 03/24/2021    GLUCOSE 155 03/23/2022    CALCIUM 9.7 03/24/2021    PROT 6.9 03/24/2021    LABALBU 4.2 03/24/2021    BILITOT <0.2 03/24/2021    ALKPHOS 71 03/24/2021    AST 18 03/24/2021    ALT 36 03/24/2021    LABGLOM >60.0 03/24/2021    GFRAA >60.0 03/24/2021    GLOB 2.7 03/24/2021     Lab Results   Component Value Date    WBC 8.0 11/08/2020    HGB 16.4 11/08/2020    HCT 44.9 11/08/2020    MCV 91.5 11/08/2020     11/08/2020     Lab Results   Component Value Date    LABA1C 6.9 03/23/2022    LABA1C 6.6 07/12/2021    LABA1C 6.7 (H) 03/24/2021     Lab Results   Component Value Date    HDL 34 (L) 12/05/2020    LDLCALC 149 (H) 12/05/2020    CHOL 229 (H) 12/05/2020    TRIG 228 (H) 12/05/2020     No results found for: TESTM  Lab Results   Component Value Date    TSH 1.780 12/05/2020     No results found for: TPOABS    Review of Systems   Constitutional: Negative for chills, fatigue and fever. HENT: Negative for congestion, ear pain, postnasal drip, rhinorrhea, sinus pressure and sore throat. Eyes: Negative for blurred vision, pain and redness. Respiratory: Negative for cough, shortness of breath and wheezing. Cardiovascular: Negative for chest pain, palpitations and leg swelling. Gastrointestinal: Negative for abdominal pain, diarrhea, nausea and vomiting. Endocrine: Negative for polydipsia and polyuria. Genitourinary: Negative for difficulty urinating. Musculoskeletal: Negative for arthralgias. Skin: Negative for rash. Allergic/Immunologic: Negative for environmental allergies. Neurological: Negative for dizziness and headaches. Hematological: Negative for adenopathy. Psychiatric/Behavioral: Negative for dysphoric mood. Objective:   Physical Exam  Vitals reviewed. Constitutional:       Appearance: He is well-developed. HENT:      Head: Normocephalic and atraumatic. Nose: No congestion. Mouth/Throat:      Mouth: Mucous membranes are moist.   Eyes:      Conjunctiva/sclera: Conjunctivae normal.   Cardiovascular:      Rate and Rhythm: Normal rate and regular rhythm. Heart sounds: Normal heart sounds. No murmur heard. Pulmonary:      Effort: Pulmonary effort is normal.      Breath sounds: Normal breath sounds. Abdominal:      General: Bowel sounds are normal.      Palpations: Abdomen is soft. Musculoskeletal:         General: No swelling. Normal range of motion. Cervical back: Normal range of motion and neck supple. Skin:     General: Skin is warm and dry. Neurological:      Mental Status: He is alert and oriented to person, place, and time.    Psychiatric:         Mood and Affect: Mood normal.

## 2022-03-24 RX ORDER — BEMPEDOIC ACID AND EZETIMIBE 180; 10 MG/1; MG/1
1 TABLET, FILM COATED ORAL DAILY
Qty: 30 TABLET | Refills: 3 | Status: SHIPPED | OUTPATIENT
Start: 2022-03-24 | End: 2022-08-31 | Stop reason: SDUPTHER

## 2022-03-29 RX ORDER — ROSUVASTATIN CALCIUM 5 MG/1
5 TABLET, COATED ORAL DAILY
Qty: 30 TABLET | Refills: 0 | OUTPATIENT
Start: 2022-03-29

## 2022-04-25 RX ORDER — ROSUVASTATIN CALCIUM 5 MG/1
5 TABLET, COATED ORAL DAILY
Qty: 30 TABLET | Refills: 0 | OUTPATIENT
Start: 2022-04-25

## 2022-04-26 DIAGNOSIS — E11.65 UNCONTROLLED TYPE 2 DIABETES MELLITUS WITH HYPERGLYCEMIA (HCC): Primary | ICD-10-CM

## 2022-04-27 RX ORDER — FLASH GLUCOSE SENSOR
KIT MISCELLANEOUS
Qty: 2 EACH | Refills: 2 | Status: SHIPPED | OUTPATIENT
Start: 2022-04-27 | End: 2022-07-20

## 2022-04-27 NOTE — TELEPHONE ENCOUNTER
Pharamcy requesting medication refill.  Please approve or deny this request.    Rx requested:  Requested Prescriptions     Pending Prescriptions Disp Refills    Continuous Blood Gluc Sensor (FREESTYLE JEFF 2 SENSOR) [facility] [Pharmacy Med Name: FreeStyle Jeff 2 Sensor kit]  2     Sig: USE AS DIRECTED EVERY 14 days         Last Office Visit:   3/23/2022      Next Visit Date:  Future Appointments   Date Time Provider Susu Li   9/21/2022  9:00 AM Nicole Rollins, [address]

## 2022-05-23 DIAGNOSIS — E11.65 UNCONTROLLED TYPE 2 DIABETES MELLITUS WITH HYPERGLYCEMIA (HCC): Primary | ICD-10-CM

## 2022-05-24 RX ORDER — ROSUVASTATIN CALCIUM 5 MG/1
5 TABLET, COATED ORAL DAILY
Qty: 30 TABLET | Refills: 0 | OUTPATIENT
Start: 2022-05-24

## 2022-05-24 RX ORDER — BLOOD SUGAR DIAGNOSTIC
STRIP MISCELLANEOUS
Qty: 100 STRIP | Refills: 3 | Status: SHIPPED | OUTPATIENT
Start: 2022-05-24

## 2022-05-24 NOTE — TELEPHONE ENCOUNTER
Pharmacy requesting medication refill.  Please approve or deny this request.    Rx requested:  Requested Prescriptions     Pending Prescriptions Disp Refills    blood glucose test strips (ONETOUCH ULTRA) strip [Pharmacy Med Name: OneTouch Ultra Test strips] 100 strip 3     Sig: Use DAILY AS NEEDED AS DIRECTED         Last Office Visit:   03/23/2022      Next Visit Date:  Future Appointments   Date Time Provider Susu Li   9/21/2022  9:00 AM Mikey Hernández, [address]

## 2022-06-03 ENCOUNTER — TELEPHONE (OUTPATIENT)
Dept: ENDOCRINOLOGY | Age: 42
End: 2022-06-03

## 2022-06-03 NOTE — TELEPHONE ENCOUNTER
PA completed through [facility], [facility] 06/03/22 for Vascepa. Decision pending.

## 2022-06-13 NOTE — TELEPHONE ENCOUNTER
PA denied. Appeal completed through [facility], [facility] 06/13/22.

## 2022-06-21 DIAGNOSIS — E11.65 UNCONTROLLED TYPE 2 DIABETES MELLITUS WITH HYPERGLYCEMIA (HCC): Primary | ICD-10-CM

## 2022-06-21 RX ORDER — DULAGLUTIDE 4.5 MG/.5ML
4.5 INJECTION, SOLUTION SUBCUTANEOUS WEEKLY
Qty: 2 ML | Refills: 3 | Status: SHIPPED | OUTPATIENT
Start: 2022-06-21 | End: 2022-08-31 | Stop reason: ALTCHOICE

## 2022-06-21 NOTE — TELEPHONE ENCOUNTER
Pharmacy requesting medication refill.  Please approve or deny this request.    Rx requested:  Requested Prescriptions     Pending Prescriptions Disp Refills    TRULICITY 4.5 SW/2.2RA SOPN [Pharmacy Med Name: Trulicity 4.5 CARLOS MANUEL/5.8 mL subcutaneous pen injector] 2 mL 3     Sig: Inject 4.5 mg into the skin once a week         Last Office Visit:   3/23/2022      Next Visit Date:  Future Appointments   Date Time Provider Susu Li   8/5/2022  3:00 PM MD JARRED Rojas [facility] [place]   9/21/2022  2:15 PM Antonette Correia, [address]

## 2022-07-19 DIAGNOSIS — E11.65 UNCONTROLLED TYPE 2 DIABETES MELLITUS WITH HYPERGLYCEMIA (HCC): ICD-10-CM

## 2022-07-20 RX ORDER — FLASH GLUCOSE SENSOR
KIT MISCELLANEOUS
Qty: 2 EACH | Refills: 2 | Status: SHIPPED | OUTPATIENT
Start: 2022-07-20 | End: 2022-10-12

## 2022-08-19 RX ORDER — PEN NEEDLE, DIABETIC 31 G X1/4"
NEEDLE, DISPOSABLE MISCELLANEOUS
Qty: 200 EACH | Refills: 3 | Status: SHIPPED | OUTPATIENT
Start: 2022-08-19

## 2022-08-31 ENCOUNTER — OFFICE VISIT (OUTPATIENT)
Dept: ENDOCRINOLOGY | Age: 42
End: 2022-08-31
Payer: COMMERCIAL

## 2022-08-31 DIAGNOSIS — E11.65 UNCONTROLLED TYPE 2 DIABETES MELLITUS WITH HYPERGLYCEMIA (HCC): Primary | ICD-10-CM

## 2022-08-31 LAB
CHP ED QC CHECK: ABNORMAL
GLUCOSE BLD-MCNC: 136 MG/DL
HBA1C MFR BLD: 6.4 %

## 2022-08-31 PROCEDURE — 83036 HEMOGLOBIN GLYCOSYLATED A1C: CPT | Performed by: PHYSICIAN ASSISTANT

## 2022-08-31 PROCEDURE — 4004F PT TOBACCO SCREEN RCVD TLK: CPT | Performed by: PHYSICIAN ASSISTANT

## 2022-08-31 PROCEDURE — G8417 CALC BMI ABV UP PARAM F/U: HCPCS | Performed by: PHYSICIAN ASSISTANT

## 2022-08-31 PROCEDURE — G8427 DOCREV CUR MEDS BY ELIG CLIN: HCPCS | Performed by: PHYSICIAN ASSISTANT

## 2022-08-31 PROCEDURE — 2022F DILAT RTA XM EVC RTNOPTHY: CPT | Performed by: PHYSICIAN ASSISTANT

## 2022-08-31 PROCEDURE — 82962 GLUCOSE BLOOD TEST: CPT | Performed by: PHYSICIAN ASSISTANT

## 2022-08-31 PROCEDURE — 3044F HG A1C LEVEL LT 7.0%: CPT | Performed by: PHYSICIAN ASSISTANT

## 2022-08-31 PROCEDURE — 99214 OFFICE O/P EST MOD 30 MIN: CPT | Performed by: PHYSICIAN ASSISTANT

## 2022-08-31 RX ORDER — BUSPIRONE HYDROCHLORIDE 10 MG/1
TABLET ORAL
COMMUNITY
Start: 2022-08-17

## 2022-08-31 RX ORDER — BEMPEDOIC ACID AND EZETIMIBE 180; 10 MG/1; MG/1
1 TABLET, FILM COATED ORAL DAILY
Qty: 30 TABLET | Refills: 3 | Status: SHIPPED | OUTPATIENT
Start: 2022-08-31

## 2022-08-31 RX ORDER — NABUMETONE 500 MG/1
TABLET, FILM COATED ORAL
COMMUNITY
Start: 2022-08-15

## 2022-08-31 RX ORDER — DULAGLUTIDE 3 MG/.5ML
3 INJECTION, SOLUTION SUBCUTANEOUS WEEKLY
Qty: 4 PEN | Refills: 3 | Status: SHIPPED | OUTPATIENT
Start: 2022-08-31

## 2022-08-31 ASSESSMENT — ENCOUNTER SYMPTOMS
ABDOMINAL PAIN: 0
EYE REDNESS: 0
DIARRHEA: 0
SHORTNESS OF BREATH: 0
WHEEZING: 0
RHINORRHEA: 0
BLURRED VISION: 0
SINUS PRESSURE: 0
NAUSEA: 0
VOMITING: 0
SORE THROAT: 0
EYE PAIN: 0
COUGH: 0

## 2022-08-31 NOTE — PATIENT INSTRUCTIONS
Endocrinology-    Check your blood sugars 4 times a day, before meals and at night  Document these numbers in a blood glucose log and bring them with you to your follow-up appointment. If you are prescribed insulin, Do not take your mealtime insulin if your blood sugars less than 120   Call our office if you have blood sugars less than 80 or greater then 300 on two or more occasions  Call our office if you have any questions regarding your blood sugars or insulin dosing regiment  Signs of low blood sugar may include tremors, feeling shaky, sweating, dizziness, confusion and weakness. Check your blood sugar immediatly if you have any of these symptoms.      The plan as discussed at your appointment-    decrease Trulicity 3.0 mg weekly    Continue lisinopril 20 mg daily   Will order Nexlizet 180-10 mg daily- sent to [facility], [address] [place] [address] - P [phone]   Vascepa 2 capsule twice a day    Freestyle Gautam 2 being used  Follow up in 6 months

## 2022-08-31 NOTE — PROGRESS NOTES
8/31/2022    Assessment:       Diagnosis Orders   1. Uncontrolled type 2 diabetes mellitus with hyperglycemia (HCC)  POCT Glucose    POCT glycosylated hemoglobin (Hb A1C)          PLAN:     Decrease Trulicity 3.0 mg weekly    Continue lisinopril 20 mg daily   Will order Nexlizet 180-10 mg daily- sent to [facility], [id number]   Vascepa 2 capsule twice a day    Freestyle Gautam 2 being used  Follow up in 6 months    Orders Placed This Encounter   Procedures    POCT Glucose    POCT glycosylated hemoglobin (Hb A1C)     No orders of the defined types were placed in this encounter. No follow-ups on file. Subjective:     Chief Complaint   Patient presents with    Follow-up    Diabetes     There were no vitals filed for this visit. Wt Readings from Last 3 Encounters:   03/23/22 284 lb (128.8 kg)   07/12/21 280 lb (127 kg)   03/24/21 285 lb (129.3 kg)     BP Readings from Last 3 Encounters:   03/23/22 120/76   07/12/21 (!) 144/89   03/24/21 135/86     Sherri Constantino is a 49-year-old male who went to the ER on 11/8/2020 with severe nasal congestion and right ear pain. Laboratory studies revealed severe hyperglycemia with hyponatremia and hyperkalemia. His A1c was 12.9%. The patient denied hospital admission, was kept in the ER for many hours and given IV insulin and 4 L of IV fluid with symptomatic relief. He was discharged home in antibiotics and is here to follow-up with me today regarding his diabetes. Diabetes  He presents for his initial diabetic visit. He has type 2 diabetes mellitus. His disease course has been worsening. There are no hypoglycemic associated symptoms. Pertinent negatives for hypoglycemia include no dizziness or headaches. Pertinent negatives for diabetes include no blurred vision, no chest pain, no fatigue, no polydipsia and no polyuria. There are no hypoglycemic complications. There are no diabetic complications.  Risk factors for coronary Rosuvastatin Myalgia       Current Outpatient Medications:     busPIRone (BUSPAR) 10 MG tablet, TAKE 1 TABLET BY MOUTH TWICE DAILY, Disp: , Rfl:     nabumetone (RELAFEN) 500 MG tablet, TAKE 1 TABLET BY MOUTH TWICE DAILY AS NEEDED FOR PAIN WITH FOOD, Disp: , Rfl:     [facility] [facility] UNIFINE PENTIPS 31G X 6 MM MISC, Use 4 times daily (before meals and nightly), Disp: 200 each, Rfl: 3    Continuous Blood Gluc Sensor (FREESTYLE JEFF 2 SENSOR) [facility], USE AS DIRECTED EVERY 14 days, Disp: 2 each, Rfl: 2    TRULICITY 4.5 AC/4.4XB SOPN, Inject 4.5 mg into the skin once a week, Disp: 2 mL, Rfl: 3    blood glucose test strips (ONETOUCH ULTRA) strip, Use DAILY AS NEEDED AS DIRECTED, Disp: 100 strip, Rfl: 3    Bempedoic Acid-Ezetimibe (NEXLIZET) 180-10 MG TABS, Take 1 tablet by mouth daily, Disp: 30 tablet, Rfl: 3    lisinopril (PRINIVIL;ZESTRIL) 20 MG tablet, Take 1 tablet by mouth daily, Disp: 90 tablet, Rfl: 1    VASCEPA 1 g CAPS capsule, Take 2 capsules by mouth 2 times daily, Disp: 60 capsule, Rfl: 3    Blood Glucose Monitoring Suppl (ONE TOUCH ULTRA 2) w/Device KIT, 1 kit by Does not apply route daily, Disp: 1 kit, Rfl: 0    ONE TOUCH ULTRASOFT LANCETS MISC, 1 each by Does not apply route daily, Disp: 100 each, Rfl: 3    Continuous Blood Gluc Sensor (FREESTYLE JEFF 2 SENSOR SYSTM) MISC, Use continuously as directed, Disp: 6 each, Rfl: 3    Cholecalciferol (VITAMIN D3) 25 MCG (1000 UT) CAPS, Take by mouth 4 daily, Disp: , Rfl:     busPIRone (BUSPAR) 5 MG tablet, Take 5 mg by mouth 2 times daily, Disp: , Rfl:   Lab Results   Component Value Date     03/23/2022    K 3.8 03/23/2022     03/23/2022    CO2 25 03/23/2022    BUN 17 03/23/2022    CREATININE 0.71 03/23/2022    GLUCOSE 136 (H) 08/31/2022    CALCIUM 9.7 03/23/2022    PROT 6.8 03/23/2022    LABALBU 4.4 03/23/2022    BILITOT <0.2 03/23/2022    ALKPHOS 60 03/23/2022    AST 20 03/23/2022    ALT 39 03/23/2022    LABGLOM >60.0 03/23/2022    GFRAA >60.0 03/23/2022 GLOB 2.4 03/23/2022     Lab Results   Component Value Date    WBC 7.8 03/23/2022    HGB 16.4 03/23/2022    HCT 47.4 03/23/2022    MCV 90.0 03/23/2022     03/23/2022     Lab Results   Component Value Date    LABA1C 6.4 (H) 08/31/2022    LABA1C 6.9 03/23/2022    LABA1C 6.6 07/12/2021     Lab Results   Component Value Date    HDL 34 (L) 12/05/2020    LDLCALC 149 (H) 12/05/2020    CHOL 229 (H) 12/05/2020    TRIG 228 (H) 12/05/2020     No results found for: TESTM  Lab Results   Component Value Date    TSH 1.780 12/05/2020     No results found for: TPOABS    Review of Systems   Constitutional:  Negative for chills, fatigue and fever. HENT:  Negative for congestion, ear pain, postnasal drip, rhinorrhea, sinus pressure and sore throat. Eyes:  Negative for blurred vision, pain and redness. Respiratory:  Negative for cough, shortness of breath and wheezing. Cardiovascular:  Negative for chest pain, palpitations and leg swelling. Gastrointestinal:  Negative for abdominal pain, diarrhea, nausea and vomiting. Endocrine: Negative for polydipsia and polyuria. Genitourinary:  Negative for difficulty urinating. Musculoskeletal:  Negative for arthralgias. Skin:  Negative for rash. Allergic/Immunologic: Negative for environmental allergies. Neurological:  Negative for dizziness and headaches. Hematological:  Negative for adenopathy. Psychiatric/Behavioral:  Negative for dysphoric mood. Objective:   Physical Exam  Vitals reviewed. Constitutional:       Appearance: He is well-developed. HENT:      Head: Normocephalic and atraumatic. Nose: No congestion. Mouth/Throat:      Mouth: Mucous membranes are moist.   Eyes:      Conjunctiva/sclera: Conjunctivae normal.   Cardiovascular:      Rate and Rhythm: Normal rate and regular rhythm. Heart sounds: Normal heart sounds. No murmur heard. Pulmonary:      Effort: Pulmonary effort is normal.      Breath sounds: Normal breath sounds. Abdominal:      General: Bowel sounds are normal.      Palpations: Abdomen is soft. Musculoskeletal:         General: No swelling. Normal range of motion. Cervical back: Normal range of motion and neck supple. Skin:     General: Skin is warm and dry. Neurological:      Mental Status: He is alert and oriented to person, place, and time.    Psychiatric:         Mood and Affect: Mood normal.

## 2022-10-12 DIAGNOSIS — E11.65 UNCONTROLLED TYPE 2 DIABETES MELLITUS WITH HYPERGLYCEMIA (HCC): ICD-10-CM

## 2022-10-12 RX ORDER — FLASH GLUCOSE SENSOR
KIT MISCELLANEOUS
Qty: 2 EACH | Refills: 5 | Status: SHIPPED | OUTPATIENT
Start: 2022-10-12

## 2022-11-08 ENCOUNTER — PATIENT MESSAGE (OUTPATIENT)
Dept: ENDOCRINOLOGY | Age: 42
End: 2022-11-08

## 2022-11-08 DIAGNOSIS — E11.65 UNCONTROLLED TYPE 2 DIABETES MELLITUS WITH HYPERGLYCEMIA (HCC): Primary | ICD-10-CM

## 2022-11-08 DIAGNOSIS — E11.65 UNCONTROLLED TYPE 2 DIABETES MELLITUS WITH HYPERGLYCEMIA (HCC): ICD-10-CM

## 2022-11-09 RX ORDER — FLASH GLUCOSE SCANNING READER
EACH MISCELLANEOUS
Qty: 1 EACH | Refills: 0 | Status: SHIPPED | OUTPATIENT
Start: 2022-11-09

## 2022-11-09 RX ORDER — FLASH GLUCOSE SENSOR
KIT MISCELLANEOUS
Qty: 2 EACH | Refills: 5 | Status: SHIPPED | OUTPATIENT
Start: 2022-11-09

## 2022-11-09 RX ORDER — BLOOD SUGAR DIAGNOSTIC
STRIP MISCELLANEOUS
Qty: 100 STRIP | Refills: 3 | Status: SHIPPED | OUTPATIENT
Start: 2022-11-09

## 2022-11-09 NOTE — TELEPHONE ENCOUNTER
From: Emily Crockett  Sent: 11/9/2022 12:44 PM EST  To: Amanda Batres Clinical Staff  Subject: Free style johann 2     This message is being sent by Kate Vazquez on behalf of Emily Crockett.     Free style johann 2 reader

## 2022-11-10 RX ORDER — DULAGLUTIDE 3 MG/.5ML
INJECTION, SOLUTION SUBCUTANEOUS
Qty: 4 ADJUSTABLE DOSE PRE-FILLED PEN SYRINGE | Refills: 3 | Status: SHIPPED | OUTPATIENT
Start: 2022-11-10

## 2022-11-10 RX ORDER — ICOSAPENT ETHYL 1000 MG/1
2 CAPSULE ORAL 2 TIMES DAILY
Qty: 60 CAPSULE | Refills: 3 | Status: SHIPPED | OUTPATIENT
Start: 2022-11-10

## 2022-12-19 ENCOUNTER — APPOINTMENT (OUTPATIENT)
Dept: GENERAL RADIOLOGY | Age: 42
End: 2022-12-19
Payer: COMMERCIAL

## 2022-12-19 ENCOUNTER — HOSPITAL ENCOUNTER (EMERGENCY)
Age: 42
Discharge: HOME | End: 2022-12-19
Payer: COMMERCIAL

## 2022-12-19 VITALS
SYSTOLIC BLOOD PRESSURE: 128 MMHG | HEIGHT: 67 IN | TEMPERATURE: 97.5 F | HEART RATE: 88 BPM | BODY MASS INDEX: 42.38 KG/M2 | OXYGEN SATURATION: 95 % | RESPIRATION RATE: 16 BRPM | WEIGHT: 270 LBS | DIASTOLIC BLOOD PRESSURE: 79 MMHG

## 2022-12-19 DIAGNOSIS — S90.31XA CONTUSION OF RIGHT FOOT, INITIAL ENCOUNTER: Primary | ICD-10-CM

## 2022-12-19 PROCEDURE — 73630 X-RAY EXAM OF FOOT: CPT

## 2022-12-19 PROCEDURE — 99283 EMERGENCY DEPT VISIT LOW MDM: CPT

## 2022-12-19 RX ORDER — IBUPROFEN 600 MG/1
600 TABLET ORAL EVERY 8 HOURS PRN
Qty: 20 TABLET | Refills: 0 | Status: SHIPPED | OUTPATIENT
Start: 2022-12-19

## 2022-12-19 RX ORDER — HYDROCODONE BITARTRATE AND ACETAMINOPHEN 5; 325 MG/1; MG/1
1 TABLET ORAL EVERY 6 HOURS PRN
Qty: 10 TABLET | Refills: 0 | Status: SHIPPED | OUTPATIENT
Start: 2022-12-19 | End: 2022-12-22

## 2022-12-19 ASSESSMENT — ENCOUNTER SYMPTOMS
ABDOMINAL PAIN: 0
COUGH: 0
SHORTNESS OF BREATH: 0
BACK PAIN: 0

## 2022-12-19 ASSESSMENT — PAIN DESCRIPTION - LOCATION: LOCATION: FOOT

## 2022-12-19 ASSESSMENT — PAIN SCALES - GENERAL: PAINLEVEL_OUTOF10: 9

## 2022-12-19 ASSESSMENT — PAIN DESCRIPTION - ORIENTATION: ORIENTATION: RIGHT

## 2022-12-19 ASSESSMENT — PAIN DESCRIPTION - DESCRIPTORS: DESCRIPTORS: ACHING;SHARP

## 2022-12-19 ASSESSMENT — PAIN DESCRIPTION - PAIN TYPE: TYPE: ACUTE PAIN

## 2022-12-19 ASSESSMENT — PAIN DESCRIPTION - FREQUENCY: FREQUENCY: CONTINUOUS

## 2022-12-19 ASSESSMENT — PAIN - FUNCTIONAL ASSESSMENT: PAIN_FUNCTIONAL_ASSESSMENT: 0-10

## 2022-12-19 NOTE — H&P
Chief Complaint   Patient presents with    Foot Injury     right       HPI:    Shraddha Ojeda is a 43 y.o. male who presents to the ED with an injury to his right foot. He states a compressor fell on the top of his right foot when he was filling up his tires. The pain is throbbing in nature. He is unable to bear weight to the foot or passively move the foot. Rates it 8/10. He has not taken any medications. Patient is a diabetic and diabetes under control with A1c at 8.6% with Trulicity. No current facility-administered medications for this encounter.      Current Outpatient Medications   Medication Sig Dispense Refill    Continuous Blood Gluc Sensor (FREESTYLE JEFF 2 SENSOR SYSTM) MISC Use continuously as directed 6 each 3    VASCEPA 1 g CAPS capsule Take 2 capsules by mouth 2 times daily 60 capsule 3    Dulaglutide (TRULICITY) 3 MH/3.1MP SOPN Once a week 4 Adjustable Dose Pre-filled Pen Syringe 3    blood glucose test strips (ONETOUCH ULTRA) strip Use DAILY AS NEEDED AS DIRECTED 100 strip 3    Continuous Blood Gluc Sensor (FREESTYLE JEFF 2 SENSOR) MISC USE AS DIRECTED EVERY 14 days 2 each 5    Continuous Blood Gluc  (FREESTYLE JEFF 2 READER) ANABEL USE AS DIRECTED. 1 each 0    busPIRone (BUSPAR) 10 MG tablet TAKE 1 TABLET BY MOUTH TWICE DAILY      nabumetone (RELAFEN) 500 MG tablet TAKE 1 TABLET BY MOUTH TWICE DAILY AS NEEDED FOR PAIN WITH FOOD      Bempedoic Acid-Ezetimibe (NEXLIZET) 180-10 MG TABS Take 1 tablet by mouth daily 30 tablet 3    [facility] [facility] UNIFINE PENTIPS 31G X 6 MM MISC Use 4 times daily (before meals and nightly) 200 each 3    lisinopril (PRINIVIL;ZESTRIL) 20 MG tablet Take 1 tablet by mouth daily 90 tablet 1    Blood Glucose Monitoring Suppl (ONE TOUCH ULTRA 2) w/Device KIT 1 kit by Does not apply route daily 1 kit 0    ONE TOUCH ULTRASOFT LANCETS MISC 1 each by Does not apply route daily 100 each 3    Cholecalciferol (VITAMIN D3) 25 MCG (1000 UT) CAPS Take by mouth 4 daily deficits. Moving all extremities. Negative Tinel sign. X-RAYS:   3 view of the RIGHT foot taken. Impression   No acute bony abnormality. Assessment & Plan    Samir Zuñiga is a 43 y.o. male  who presented to the emergency department. The patient was evaluated and discussed with the ED provider Dr. Jeff Gaming. The patient was also evaluated by the attending physician. All care plans were discussed and agreed upon. On arrival to the emergency department, the patient was assigned an exam room. An H&P was performed. Given the above findings, patient will be sent home with a CAM walker and referred to the podiatry clinic for evaluation. Pain in right Foot   CAM walker dispensed to patient   Instructed on limited weight bearing until seen in podiatry clinic.        Ivy Newton DPM, PGY-1  Podiatry Resident   12/19/2022 11:00 AM

## 2022-12-19 NOTE — ED PROVIDER NOTES
[address] [facility] ED  eMERGENCY dEPARTMENT eNCOUnter      Pt Name: Anna Glynn  MRN: [id number]  [place] 1980  Date of evaluation: 12/19/2022  Provider: CHRIST Allison CNP      HISTORY OF PRESENT ILLNESS    Anna Glynn is a 43 y.o. male who presents to the Emergency Department with R foot pain that started after dropping an air compressor on his foot PTA. Pain is moderate. He states it is painful to walk. REVIEW OF SYSTEMS       Review of Systems   Constitutional:  Negative for fever. HENT:  Negative for congestion. Respiratory:  Negative for cough and shortness of breath. Cardiovascular:  Negative for chest pain. Gastrointestinal:  Negative for abdominal pain. Genitourinary:  Negative for dysuria. Musculoskeletal:  Negative for arthralgias and back pain. R foot pain   Skin:  Negative for rash. All other systems reviewed and are negative. PAST MEDICAL HISTORY     Past Medical History:   Diagnosis Date    Diabetes mellitus ([facility] [address]     Hypertension          SURGICAL HISTORY     History reviewed. No pertinent surgical history. CURRENT MEDICATIONS       Previous Medications    BEMPEDOIC ACID-EZETIMIBE (NEXLIZET) 180-10 MG TABS    Take 1 tablet by mouth daily    BLOOD GLUCOSE MONITORING SUPPL (ONE TOUCH ULTRA 2) W/DEVICE KIT    1 kit by Does not apply route daily    BLOOD GLUCOSE TEST STRIPS (ONETOUCH ULTRA) STRIP    Use DAILY AS NEEDED AS DIRECTED    BUSPIRONE (BUSPAR) 10 MG TABLET    TAKE 1 TABLET BY MOUTH TWICE DAILY    BUSPIRONE (BUSPAR) 5 MG TABLET    Take 5 mg by mouth 2 times daily    CHOLECALCIFEROL (VITAMIN D3) 25 MCG (1000 UT) CAPS    Take by mouth 4 daily    CONTINUOUS BLOOD GLUC  (FREESTYLE JEFF 2 READER) ANABEL    USE AS DIRECTED.     CONTINUOUS BLOOD GLUC SENSOR (FREESTYLE JEFF 2 SENSOR SYSTM) MISC    Use continuously as directed    CONTINUOUS BLOOD GLUC SENSOR (FREESTYLE JEFF 2 SENSOR) MISC    USE AS DIRECTED EVERY 14 days    [facility] UNIFINE PENTIPS 31G X 6 MM MISC    Use 4 times daily (before meals and nightly)    DULAGLUTIDE (TRULICITY) 3 DX/8.9PQ SOPN    Once a week    LISINOPRIL (PRINIVIL;ZESTRIL) 20 MG TABLET    Take 1 tablet by mouth daily    NABUMETONE (RELAFEN) 500 MG TABLET    TAKE 1 TABLET BY MOUTH TWICE DAILY AS NEEDED FOR PAIN WITH FOOD    ONE TOUCH ULTRASOFT LANCETS MISC    1 each by Does not apply route daily    VASCEPA 1 G CAPS CAPSULE    Take 2 capsules by mouth 2 times daily       ALLERGIES     Atorvastatin and Rosuvastatin    FAMILY HISTORY     History reviewed. No pertinent family history. SOCIAL HISTORY       Social History     Socioeconomic History    Marital status:      Spouse name: None    Number of children: None    Years of education: None    Highest education level: None   Tobacco Use    Smoking status: Every Day    Smokeless tobacco: Never   Vaping Use    Vaping Use: Never used       SCREENINGS    [place] Coma Scale  Eye Opening: Spontaneous  Best Verbal Response: Oriented  Best Motor Response: Obeys commands  [place] Coma Scale Score: 15 @FLOW([id number])@      PHYSICAL EXAM    (up to 7 for level 4, 8 or more for level 5)     ED Triage Vitals [12/19/22 1037]   BP Temp Temp Source Heart Rate Resp SpO2 Height Weight   132/77 97.5 °F (36.4 °C) Oral 91 18 95 % 5' 7\" (1.702 m) 270 lb (122.5 kg)       Physical Exam  Vitals and nursing note reviewed. Constitutional:       Appearance: He is well-developed. HENT:      Head: Normocephalic and atraumatic. Right Ear: Hearing, tympanic membrane, ear canal and external ear normal.      Left Ear: Hearing, tympanic membrane, ear canal and external ear normal.      Nose: Nose normal.      Mouth/Throat:      Lips: Pink. Mouth: Mucous membranes are moist.   Eyes:      Conjunctiva/sclera: Conjunctivae normal.      Pupils: Pupils are equal, round, and reactive to light. Cardiovascular:      Rate and Rhythm: Normal rate and regular rhythm.       Heart sounds: Normal heart sounds. Pulmonary:      Effort: Pulmonary effort is normal. No accessory muscle usage or respiratory distress. Breath sounds: Normal breath sounds. No decreased air movement. No decreased breath sounds, wheezing or rhonchi. Abdominal:      General: Bowel sounds are normal. There is no distension. Palpations: Abdomen is soft. Tenderness: There is no abdominal tenderness. Musculoskeletal:         General: Normal range of motion. Cervical back: Normal range of motion and neck supple. Right foot: Normal range of motion and normal capillary refill. Tenderness present. No swelling or deformity. Normal pulse. Feet:    Skin:     General: Skin is warm and dry. Neurological:      General: No focal deficit present. Mental Status: He is alert and oriented to person, place, and time. GCS: GCS eye subscore is 4. GCS verbal subscore is 5. GCS motor subscore is 6. Deep Tendon Reflexes: Reflexes are normal and symmetric. Psychiatric:         Judgment: Judgment normal.         All other labs were within normal range or not returned as of this dictation. EMERGENCY DEPARTMENT COURSE and DIFFERENTIALDIAGNOSIS/MDM:   Vitals:    Vitals:    12/19/22 1037   BP: 132/77   Pulse: 91   Resp: 18   Temp: 97.5 °F (36.4 °C)   TempSrc: Oral   SpO2: 95%   Weight: 270 lb (122.5 kg)   Height: 5' 7\" (1.702 m)            43 yr old male with R foot contusion. Walking boot applied. Rx were sent to the pharmacy. F/U With podiatry in 2-3 days. Patient verbalizes understanding. PROCEDURES:  Unless otherwise noted below, none     Procedures      FINAL IMPRESSION      1.  Contusion of right foot, initial encounter          DISPOSITION/PLAN   DISPOSITION Decision To Discharge 12/19/2022 11:33:24 AM          CHRIST Garcia CNP (electronically signed)  Attending Emergency Physician      CHRIST Garcia CNP  12/19/22 0413

## 2022-12-19 NOTE — ED TRIAGE NOTES
Pt states that he had a compressor land on his right foot today, non-work related. Pt states that he is having pain and can not walk on his foot.

## 2022-12-19 NOTE — Clinical Note
Linda Rowland was seen and treated in our emergency department on 12/19/2022. He may return to work on 12/27/2022. If you have any questions or concerns, please don't hesitate to call.       Yulissa Hernández, APRN - CNP

## 2023-02-08 RX ORDER — LISINOPRIL 20 MG/1
20 TABLET ORAL DAILY
Qty: 90 TABLET | Refills: 1 | Status: SHIPPED | OUTPATIENT
Start: 2023-02-08

## 2023-04-04 RX ORDER — PEN NEEDLE, DIABETIC 31 G X1/4"
NEEDLE, DISPOSABLE MISCELLANEOUS
Qty: 200 EACH | Refills: 3 | Status: SHIPPED | OUTPATIENT
Start: 2023-04-04

## 2023-05-26 DIAGNOSIS — E11.65 UNCONTROLLED TYPE 2 DIABETES MELLITUS WITH HYPERGLYCEMIA (HCC): ICD-10-CM

## 2023-07-21 LAB
ALANINE AMINOTRANSFERASE (SGPT) (U/L) IN SER/PLAS: 46 U/L (ref 10–52)
ALBUMIN (G/DL) IN SER/PLAS: 4.5 G/DL (ref 3.4–5)
ALBUMIN (MG/L) IN URINE: 29.3 MG/L
ALBUMIN/CREATININE (UG/MG) IN URINE: 22.7 UG/MG CRT (ref 0–30)
ALKALINE PHOSPHATASE (U/L) IN SER/PLAS: 50 U/L (ref 33–120)
ANION GAP IN SER/PLAS: 13 MMOL/L (ref 10–20)
APPEARANCE, URINE: CLEAR
ASPARTATE AMINOTRANSFERASE (SGOT) (U/L) IN SER/PLAS: 23 U/L (ref 9–39)
BASOPHILS (10*3/UL) IN BLOOD BY AUTOMATED COUNT: 0.03 X10E9/L (ref 0–0.1)
BASOPHILS/100 LEUKOCYTES IN BLOOD BY AUTOMATED COUNT: 0.4 % (ref 0–2)
BILIRUBIN TOTAL (MG/DL) IN SER/PLAS: 0.4 MG/DL (ref 0–1.2)
BILIRUBIN, URINE: NEGATIVE
BLOOD, URINE: NEGATIVE
CALCIUM (MG/DL) IN SER/PLAS: 9.6 MG/DL (ref 8.6–10.3)
CARBON DIOXIDE, TOTAL (MMOL/L) IN SER/PLAS: 25 MMOL/L (ref 21–32)
CHLORIDE (MMOL/L) IN SER/PLAS: 101 MMOL/L (ref 98–107)
CHOLESTEROL (MG/DL) IN SER/PLAS: 192 MG/DL (ref 0–199)
CHOLESTEROL IN HDL (MG/DL) IN SER/PLAS: 36.9 MG/DL
CHOLESTEROL IN LDL (MG/DL) IN SER/PLAS BY DIRECT ASSAY: 131 MG/DL (ref 0–129)
CHOLESTEROL/HDL RATIO: 5.2
COLOR, URINE: YELLOW
CREATININE (MG/DL) IN SER/PLAS: 0.87 MG/DL (ref 0.5–1.3)
CREATININE (MG/DL) IN URINE: 129 MG/DL (ref 20–370)
EOSINOPHILS (10*3/UL) IN BLOOD BY AUTOMATED COUNT: 0.24 X10E9/L (ref 0–0.7)
EOSINOPHILS/100 LEUKOCYTES IN BLOOD BY AUTOMATED COUNT: 3.1 % (ref 0–6)
ERYTHROCYTE DISTRIBUTION WIDTH (RATIO) BY AUTOMATED COUNT: 13.2 % (ref 11.5–14.5)
ERYTHROCYTE MEAN CORPUSCULAR HEMOGLOBIN CONCENTRATION (G/DL) BY AUTOMATED: 33.1 G/DL (ref 32–36)
ERYTHROCYTE MEAN CORPUSCULAR VOLUME (FL) BY AUTOMATED COUNT: 93 FL (ref 80–100)
ERYTHROCYTES (10*6/UL) IN BLOOD BY AUTOMATED COUNT: 5.39 X10E12/L (ref 4.5–5.9)
ESTIMATED AVERAGE GLUCOSE FOR HBA1C: 166 MG/DL
GFR MALE: >90 ML/MIN/1.73M2
GLUCOSE (MG/DL) IN SER/PLAS: 166 MG/DL (ref 74–99)
GLUCOSE, URINE: NEGATIVE MG/DL
HEMATOCRIT (%) IN BLOOD BY AUTOMATED COUNT: 50.1 % (ref 41–52)
HEMOGLOBIN (G/DL) IN BLOOD: 16.6 G/DL (ref 13.5–17.5)
HEMOGLOBIN A1C/HEMOGLOBIN TOTAL IN BLOOD: 7.4 %
IMMATURE GRANULOCYTES/100 LEUKOCYTES IN BLOOD BY AUTOMATED COUNT: 0.8 % (ref 0–0.9)
KETONES, URINE: NEGATIVE MG/DL
LDL: 110 MG/DL (ref 0–99)
LEUKOCYTE ESTERASE, URINE: NEGATIVE
LEUKOCYTES (10*3/UL) IN BLOOD BY AUTOMATED COUNT: 7.7 X10E9/L (ref 4.4–11.3)
LYMPHOCYTES (10*3/UL) IN BLOOD BY AUTOMATED COUNT: 2.66 X10E9/L (ref 1.2–4.8)
LYMPHOCYTES/100 LEUKOCYTES IN BLOOD BY AUTOMATED COUNT: 34.6 % (ref 13–44)
MONOCYTES (10*3/UL) IN BLOOD BY AUTOMATED COUNT: 0.75 X10E9/L (ref 0.1–1)
MONOCYTES/100 LEUKOCYTES IN BLOOD BY AUTOMATED COUNT: 9.8 % (ref 2–10)
NEUTROPHILS (10*3/UL) IN BLOOD BY AUTOMATED COUNT: 3.94 X10E9/L (ref 1.2–7.7)
NEUTROPHILS/100 LEUKOCYTES IN BLOOD BY AUTOMATED COUNT: 51.3 % (ref 40–80)
NITRITE, URINE: NEGATIVE
NON HDL CHOLESTEROL: 155 MG/DL
PH, URINE: 5 (ref 5–8)
PLATELETS (10*3/UL) IN BLOOD AUTOMATED COUNT: 244 X10E9/L (ref 150–450)
POTASSIUM (MMOL/L) IN SER/PLAS: 4.3 MMOL/L (ref 3.5–5.3)
PROTEIN TOTAL: 7.1 G/DL (ref 6.4–8.2)
PROTEIN, URINE: NEGATIVE MG/DL
SODIUM (MMOL/L) IN SER/PLAS: 135 MMOL/L (ref 136–145)
SPECIFIC GRAVITY, URINE: 1.02 (ref 1–1.03)
THYROTROPIN (MIU/L) IN SER/PLAS BY DETECTION LIMIT <= 0.05 MIU/L: 1.91 MIU/L (ref 0.44–3.98)
TRIGLYCERIDE (MG/DL) IN SER/PLAS: 228 MG/DL (ref 0–149)
UREA NITROGEN (MG/DL) IN SER/PLAS: 16 MG/DL (ref 6–23)
UROBILINOGEN, URINE: <2 MG/DL (ref 0–1.9)
VLDL: 46 MG/DL (ref 0–40)

## 2023-07-27 ENCOUNTER — OFFICE VISIT (OUTPATIENT)
Dept: ENDOCRINOLOGY | Age: 43
End: 2023-07-27
Payer: COMMERCIAL

## 2023-07-27 ENCOUNTER — TELEPHONE (OUTPATIENT)
Dept: ENDOCRINOLOGY | Age: 43
End: 2023-07-27

## 2023-07-27 VITALS
WEIGHT: 270 LBS | HEART RATE: 87 BPM | BODY MASS INDEX: 42.38 KG/M2 | SYSTOLIC BLOOD PRESSURE: 138 MMHG | HEIGHT: 67 IN | DIASTOLIC BLOOD PRESSURE: 89 MMHG | OXYGEN SATURATION: 93 %

## 2023-07-27 DIAGNOSIS — E11.65 UNCONTROLLED TYPE 2 DIABETES MELLITUS WITH HYPERGLYCEMIA (HCC): Primary | ICD-10-CM

## 2023-07-27 LAB
CHP ED QC CHECK: ABNORMAL
GLUCOSE BLD-MCNC: 175 MG/DL

## 2023-07-27 PROCEDURE — 99214 OFFICE O/P EST MOD 30 MIN: CPT | Performed by: PHYSICIAN ASSISTANT

## 2023-07-27 PROCEDURE — G8417 CALC BMI ABV UP PARAM F/U: HCPCS | Performed by: PHYSICIAN ASSISTANT

## 2023-07-27 PROCEDURE — G8427 DOCREV CUR MEDS BY ELIG CLIN: HCPCS | Performed by: PHYSICIAN ASSISTANT

## 2023-07-27 PROCEDURE — 3079F DIAST BP 80-89 MM HG: CPT | Performed by: PHYSICIAN ASSISTANT

## 2023-07-27 PROCEDURE — 3075F SYST BP GE 130 - 139MM HG: CPT | Performed by: PHYSICIAN ASSISTANT

## 2023-07-27 PROCEDURE — 4004F PT TOBACCO SCREEN RCVD TLK: CPT | Performed by: PHYSICIAN ASSISTANT

## 2023-07-27 PROCEDURE — 2022F DILAT RTA XM EVC RTNOPTHY: CPT | Performed by: PHYSICIAN ASSISTANT

## 2023-07-27 PROCEDURE — 3046F HEMOGLOBIN A1C LEVEL >9.0%: CPT | Performed by: PHYSICIAN ASSISTANT

## 2023-07-27 PROCEDURE — 82962 GLUCOSE BLOOD TEST: CPT | Performed by: PHYSICIAN ASSISTANT

## 2023-07-27 RX ORDER — LISINOPRIL 40 MG/1
40 TABLET ORAL DAILY
COMMUNITY
Start: 2023-07-19

## 2023-07-27 RX ORDER — BUSPIRONE HYDROCHLORIDE 15 MG/1
15 TABLET ORAL 2 TIMES DAILY
COMMUNITY
Start: 2023-07-19

## 2023-07-27 RX ORDER — TIRZEPATIDE 7.5 MG/.5ML
INJECTION, SOLUTION SUBCUTANEOUS
Qty: 4 ADJUSTABLE DOSE PRE-FILLED PEN SYRINGE | Refills: 5 | Status: SHIPPED | OUTPATIENT
Start: 2023-07-27

## 2023-07-27 RX ORDER — TIRZEPATIDE 5 MG/.5ML
INJECTION, SOLUTION SUBCUTANEOUS
Qty: 4 ADJUSTABLE DOSE PRE-FILLED PEN SYRINGE | Refills: 3 | Status: SHIPPED | OUTPATIENT
Start: 2023-07-27

## 2023-07-27 RX ORDER — BEMPEDOIC ACID AND EZETIMIBE 180; 10 MG/1; MG/1
1 TABLET, FILM COATED ORAL DAILY
Qty: 30 TABLET | Refills: 3 | Status: SHIPPED | OUTPATIENT
Start: 2023-07-27

## 2023-07-27 RX ORDER — ACYCLOVIR 400 MG/1
1 TABLET ORAL
Qty: 12 EACH | Refills: 10 | Status: SHIPPED | OUTPATIENT
Start: 2023-07-27

## 2023-07-27 ASSESSMENT — ENCOUNTER SYMPTOMS
SHORTNESS OF BREATH: 0
DIARRHEA: 0
ABDOMINAL PAIN: 0
EYE PAIN: 0
SORE THROAT: 0
EYE REDNESS: 0
RHINORRHEA: 0
BLURRED VISION: 0
COUGH: 0
SINUS PRESSURE: 0
WHEEZING: 0
VOMITING: 0
NAUSEA: 0

## 2023-07-27 NOTE — PROGRESS NOTES
Conjunctiva/sclera: Conjunctivae normal.   Cardiovascular:      Rate and Rhythm: Normal rate and regular rhythm. Heart sounds: Normal heart sounds. No murmur heard. Pulmonary:      Effort: Pulmonary effort is normal.      Breath sounds: Normal breath sounds. Abdominal:      General: Bowel sounds are normal.      Palpations: Abdomen is soft. Musculoskeletal:         General: No swelling. Normal range of motion. Cervical back: Normal range of motion and neck supple. Skin:     General: Skin is warm and dry. Neurological:      Mental Status: He is alert and oriented to person, place, and time.    Psychiatric:         Mood and Affect: Mood normal.

## 2023-07-27 NOTE — TELEPHONE ENCOUNTER
Pt called and stated that he was told by he pharmacy that he needs prior authorization on meds prescribed today:  Mounjaro 5mg  Mounjaro 7.5  [address] is [facility] [place] [facility] [address] in [place]

## 2023-07-27 NOTE — PATIENT INSTRUCTIONS
Endocrinology-    Check your blood sugars 4 times a day, before meals and at night  Document these numbers in a blood glucose log and bring them with you to your follow-up appointment. If you are prescribed insulin, Do not take your mealtime insulin if your blood sugars less than 120   Call our office if you have blood sugars less than 80 or greater then 300 on two or more occasions  Call our office if you have any questions regarding your blood sugars or insulin dosing regiment  Signs of low blood sugar may include tremors, feeling shaky, sweating, dizziness, confusion and weakness. Check your blood sugar immediatly if you have any of these symptoms.      The plan as discussed at your appointment-    Stop Trulicity 3.0 mg weekly    Inject Mounjaro 5.0 mg once weekly for 4 weeks, increase to 7.5 mg injected once weekly in 1 month if no nausea or vomiting    Continue lisinopril 20 mg daily   Will order Nexlizet 180-10 mg daily  Vascepa 2 capsule twice a day    Freestyle Gautam 2 being used  Dexcom G7 ordered  Follow up in 3 months

## 2023-07-31 ENCOUNTER — TELEPHONE (OUTPATIENT)
Dept: ENDOCRINOLOGY | Age: 43
End: 2023-07-31

## 2023-08-23 RX ORDER — DULAGLUTIDE 3 MG/.5ML
INJECTION, SOLUTION SUBCUTANEOUS
Qty: 4 ML | Refills: 3 | OUTPATIENT
Start: 2023-08-23

## 2023-09-26 ENCOUNTER — TELEPHONE (OUTPATIENT)
Dept: ADMINISTRATIVE | Age: 43
End: 2023-09-26

## 2023-09-26 NOTE — TELEPHONE ENCOUNTER
Patient called states the pharmacy needs a new script for the medication Truliste (spelled wrong)  Pharmacy is [facility] [place] [place] [facility].

## 2023-09-27 NOTE — TELEPHONE ENCOUNTER
Spoke with patient he stated that his insurance did not approve for the [facility] so he has continued taking the Trulicity Dose 3.0 Mg to be sent to  in [facility].

## 2023-09-28 RX ORDER — DULAGLUTIDE 3 MG/.5ML
3 INJECTION, SOLUTION SUBCUTANEOUS WEEKLY
Qty: 4 ADJUSTABLE DOSE PRE-FILLED PEN SYRINGE | Refills: 3 | Status: SHIPPED | OUTPATIENT
Start: 2023-09-28

## 2023-11-15 RX ORDER — PEN NEEDLE, DIABETIC 31 G X1/4"
NEEDLE, DISPOSABLE MISCELLANEOUS
Qty: 200 EACH | Refills: 3 | Status: SHIPPED | OUTPATIENT
Start: 2023-11-15

## 2024-05-08 NOTE — PROGRESS NOTES
Subjective   Patient ID: Modesto Callejas is a 43 y.o. male who presents for No chief complaint on file..  HPI    Review of Systems    Objective   There were no vitals taken for this visit.   Physical Exam    Assessment/Plan   Problem List Items Addressed This Visit    None      Patient education provided.  Stay current with age appropriate health maintenance as instructed.  Appointment here or ER with new or worsening symptoms'  Keep appropriate follow-up visit.  Stay current with proper immunizations

## 2024-05-09 ENCOUNTER — APPOINTMENT (OUTPATIENT)
Dept: PRIMARY CARE | Facility: CLINIC | Age: 44
End: 2024-05-09
Payer: COMMERCIAL

## 2024-05-15 RX ORDER — DULAGLUTIDE 3 MG/.5ML
3 INJECTION, SOLUTION SUBCUTANEOUS WEEKLY
Qty: 4 ADJUSTABLE DOSE PRE-FILLED PEN SYRINGE | Refills: 0 | Status: SHIPPED | OUTPATIENT
Start: 2024-05-15

## 2024-05-17 ENCOUNTER — OFFICE VISIT (OUTPATIENT)
Dept: PRIMARY CARE | Facility: CLINIC | Age: 44
End: 2024-05-17
Payer: COMMERCIAL

## 2024-05-17 VITALS
SYSTOLIC BLOOD PRESSURE: 120 MMHG | OXYGEN SATURATION: 90 % | HEART RATE: 90 BPM | HEIGHT: 67 IN | BODY MASS INDEX: 45.11 KG/M2 | WEIGHT: 287.4 LBS | DIASTOLIC BLOOD PRESSURE: 70 MMHG

## 2024-05-17 DIAGNOSIS — F41.9 ANXIETY: ICD-10-CM

## 2024-05-17 DIAGNOSIS — Z72.0 TOBACCO ABUSE: ICD-10-CM

## 2024-05-17 DIAGNOSIS — R06.09 DOE (DYSPNEA ON EXERTION): ICD-10-CM

## 2024-05-17 DIAGNOSIS — E78.2 MIXED HYPERLIPIDEMIA: ICD-10-CM

## 2024-05-17 DIAGNOSIS — I10 PRIMARY HYPERTENSION: Primary | ICD-10-CM

## 2024-05-17 DIAGNOSIS — G47.30 SLEEP APNEA, UNSPECIFIED TYPE: ICD-10-CM

## 2024-05-17 DIAGNOSIS — Z13.6 SCREENING FOR HEART DISEASE: ICD-10-CM

## 2024-05-17 PROCEDURE — 99203 OFFICE O/P NEW LOW 30 MIN: CPT | Performed by: FAMILY MEDICINE

## 2024-05-17 PROCEDURE — 3074F SYST BP LT 130 MM HG: CPT | Performed by: FAMILY MEDICINE

## 2024-05-17 PROCEDURE — 3078F DIAST BP <80 MM HG: CPT | Performed by: FAMILY MEDICINE

## 2024-05-17 RX ORDER — LISINOPRIL 40 MG/1
40 TABLET ORAL DAILY
Qty: 90 TABLET | Refills: 3 | Status: SHIPPED | OUTPATIENT
Start: 2024-05-17

## 2024-05-17 RX ORDER — LISINOPRIL 40 MG/1
40 TABLET ORAL DAILY
COMMUNITY
End: 2024-05-17 | Stop reason: SDUPTHER

## 2024-05-17 RX ORDER — BUSPIRONE HYDROCHLORIDE 15 MG/1
15 TABLET ORAL 2 TIMES DAILY
COMMUNITY
End: 2024-05-17 | Stop reason: SDUPTHER

## 2024-05-17 RX ORDER — BUSPIRONE HYDROCHLORIDE 15 MG/1
15 TABLET ORAL 2 TIMES DAILY
Qty: 90 TABLET | Refills: 3 | Status: SHIPPED | OUTPATIENT
Start: 2024-05-17

## 2024-05-17 RX ORDER — ESCITALOPRAM OXALATE 5 MG/1
5 TABLET ORAL DAILY
Qty: 30 TABLET | Refills: 2 | Status: SHIPPED | OUTPATIENT
Start: 2024-05-17 | End: 2024-08-15

## 2024-05-17 RX ORDER — DULAGLUTIDE 4.5 MG/.5ML
INJECTION, SOLUTION SUBCUTANEOUS
COMMUNITY

## 2024-05-17 ASSESSMENT — ENCOUNTER SYMPTOMS
HEMATURIA: 0
BRUISES/BLEEDS EASILY: 0
HEADACHES: 0
FATIGUE: 0
CONSTIPATION: 0
SHORTNESS OF BREATH: 0
EYE DISCHARGE: 0
VOMITING: 0
ACTIVITY CHANGE: 0
WEAKNESS: 0
NUMBNESS: 0
SORE THROAT: 0
BLOOD IN STOOL: 0
DIFFICULTY URINATING: 0
ABDOMINAL PAIN: 0
DIZZINESS: 0
NAUSEA: 0
MYALGIAS: 0
CHEST TIGHTNESS: 0
ADENOPATHY: 0
COUGH: 0
NERVOUS/ANXIOUS: 0
NECK PAIN: 0
DYSPHORIC MOOD: 0
ARTHRALGIAS: 0
BACK PAIN: 0
DIARRHEA: 0

## 2024-05-17 NOTE — PROGRESS NOTES
"Subjective   Patient ID: Modesto Callejas is a 43 y.o. male who presents for New Patient Visit.  HPI    New patient visit    Here with spouse    Several issues    Hypertension stable  No chest pain or shortness of breath    Patient smokes and should quit  Recommend smoking cessation    Anxiety stable  No suicidal ideation no psychotic or manic symptoms    High cholesterol stable watch diet follow blood work    Poor sleep with sleep apnea symptoms recommend sleep study    Dyspnea on exertion suggest cardiac coronary screening and stress testing    Discussed at length with patient and spouse  Lifestyle modification recommended      Review of Systems   Constitutional:  Negative for activity change and fatigue.   HENT:  Negative for congestion and sore throat.    Eyes:  Negative for discharge.   Respiratory:  Negative for cough, chest tightness and shortness of breath.    Cardiovascular:  Negative for chest pain and leg swelling.   Gastrointestinal:  Negative for abdominal pain, blood in stool, constipation, diarrhea, nausea and vomiting.   Endocrine: Negative for cold intolerance and heat intolerance.   Genitourinary:  Negative for difficulty urinating and hematuria.   Musculoskeletal:  Negative for arthralgias, back pain, gait problem, myalgias and neck pain.   Allergic/Immunologic: Negative for environmental allergies.   Neurological:  Negative for dizziness, syncope, weakness, numbness and headaches.   Hematological:  Negative for adenopathy. Does not bruise/bleed easily.   Psychiatric/Behavioral:  Negative for dysphoric mood. The patient is not nervous/anxious.    All other systems reviewed and are negative.      Objective   /70   Pulse 90   Ht 1.702 m (5' 7\")   Wt 130 kg (287 lb 6.4 oz)   SpO2 90%   BMI 45.01 kg/m²    Physical Exam  Vitals and nursing note reviewed.   Constitutional:       General: He is not in acute distress.     Appearance: Normal appearance. He is obese.   HENT:      Head: " Normocephalic and atraumatic.      Right Ear: Tympanic membrane, ear canal and external ear normal.      Left Ear: Tympanic membrane, ear canal and external ear normal.      Nose: Nose normal.      Mouth/Throat:      Mouth: Mucous membranes are moist.      Pharynx: Oropharynx is clear. No oropharyngeal exudate or posterior oropharyngeal erythema.   Eyes:      Extraocular Movements: Extraocular movements intact.      Conjunctiva/sclera: Conjunctivae normal.      Pupils: Pupils are equal, round, and reactive to light.   Cardiovascular:      Rate and Rhythm: Normal rate and regular rhythm.      Pulses: Normal pulses.      Heart sounds: Normal heart sounds. No murmur heard.  Pulmonary:      Effort: Pulmonary effort is normal. No respiratory distress.      Breath sounds: Normal breath sounds. No wheezing or rales.   Abdominal:      General: Abdomen is flat. Bowel sounds are normal. There is no distension.      Palpations: Abdomen is soft. There is no mass.      Tenderness: There is no abdominal tenderness.   Musculoskeletal:         General: No swelling or deformity. Normal range of motion.      Cervical back: Normal range of motion and neck supple.      Right lower leg: No edema.      Left lower leg: No edema.   Lymphadenopathy:      Cervical: No cervical adenopathy.   Skin:     General: Skin is warm and dry.      Capillary Refill: Capillary refill takes less than 2 seconds.      Findings: No lesion or rash.   Neurological:      General: No focal deficit present.      Mental Status: He is alert and oriented to person, place, and time.      Cranial Nerves: No cranial nerve deficit.      Motor: No weakness.   Psychiatric:         Mood and Affect: Mood normal.         Behavior: Behavior normal.         Thought Content: Thought content normal.         Judgment: Judgment normal.         Assessment/Plan   Problem List Items Addressed This Visit    None  Visit Diagnoses       Primary hypertension    -  Primary    Relevant  Medications    lisinopril 40 mg tablet    Other Relevant Orders    CBC and Auto Differential    Comprehensive Metabolic Panel    Tobacco abuse        Anxiety        Relevant Medications    busPIRone (Buspar) 15 mg tablet    escitalopram (Lexapro) 5 mg tablet    Mixed hyperlipidemia        Relevant Orders    Lipid Panel    Follow Up In [facility] - PCP    Sleep apnea, unspecified type        Relevant Orders    Home sleep apnea test (HSAT)    MAGANA (dyspnea on exertion)        Relevant Orders    Stress Test    Screening for heart disease        Relevant Orders    CT cardiac scoring wo IV contrast            Patient education provided.  Stay current with age appropriate health maintenance as instructed.  Appointment here or ER with new or worsening symptoms'  Keep appropriate follow-up visit.  Stay current with proper immunizations   Weight loss and diet  Testing as above  Recheck 3 months and as needed return sooner with new or worsening symptoms  Discussed at length with patient and spouse  Stop smoking

## 2024-05-31 ENCOUNTER — APPOINTMENT (OUTPATIENT)
Dept: CARDIOLOGY | Facility: HOSPITAL | Age: 44
End: 2024-05-31
Payer: COMMERCIAL

## 2024-06-12 ENCOUNTER — APPOINTMENT (OUTPATIENT)
Dept: CARDIOLOGY | Facility: CLINIC | Age: 44
End: 2024-06-12
Payer: COMMERCIAL

## 2024-06-19 ENCOUNTER — APPOINTMENT (OUTPATIENT)
Dept: CARDIOLOGY | Facility: HOSPITAL | Age: 44
End: 2024-06-19
Payer: COMMERCIAL

## 2024-07-22 RX ORDER — PEN NEEDLE, DIABETIC 31 G X1/4"
NEEDLE, DISPOSABLE MISCELLANEOUS
Qty: 200 EACH | Refills: 3 | OUTPATIENT
Start: 2024-07-22

## 2024-08-06 ENCOUNTER — TELEPHONE (OUTPATIENT)
Dept: PRIMARY CARE | Facility: CLINIC | Age: 44
End: 2024-08-06
Payer: COMMERCIAL

## 2024-08-06 NOTE — TELEPHONE ENCOUNTER
LMOM TO RESCHEDULE 3 month APPOINTMENT THAT PATIENT CANCELLED FOR 8/13. IF/WHEN PATIENT RETURNS CALL, PLEASE SCHEDULE IN NEXT AVAILABLE OPENING THAT PROVIDER HAS THAT IS CONVENIENT FOR PATIENT.

## 2024-08-08 RX ORDER — ACYCLOVIR 400 MG/1
TABLET ORAL
Qty: 12 EACH | Refills: 10 | OUTPATIENT
Start: 2024-08-08

## 2024-08-08 RX ORDER — PEN NEEDLE, DIABETIC 31 G X1/4"
NEEDLE, DISPOSABLE MISCELLANEOUS
Qty: 200 EACH | Refills: 3 | OUTPATIENT
Start: 2024-08-08

## 2024-08-13 ENCOUNTER — APPOINTMENT (OUTPATIENT)
Dept: PRIMARY CARE | Facility: CLINIC | Age: 44
End: 2024-08-13
Payer: COMMERCIAL

## 2024-08-27 DIAGNOSIS — E11.9 CONTROLLED TYPE 2 DIABETES MELLITUS WITHOUT COMPLICATION, WITHOUT LONG-TERM CURRENT USE OF INSULIN (MULTI): Primary | ICD-10-CM

## 2024-08-27 RX ORDER — DULAGLUTIDE 3 MG/.5ML
INJECTION, SOLUTION SUBCUTANEOUS
Qty: 2 ML | Refills: 0 | Status: SHIPPED | OUTPATIENT
Start: 2024-08-27

## 2024-09-03 DIAGNOSIS — F41.9 ANXIETY: ICD-10-CM

## 2024-09-03 NOTE — TELEPHONE ENCOUNTER
Rx Refill Request     Name: Modesto Callejas  :  1980   Medication Name:  escitalopram   Specific Pharmacy location:  [facility] in [place]   Date of last appointment:  Visit date not found   Date of next appointment:  Visit date not found   Best number to reach patient:  [phone]         Recent Visits  Date Type Provider Dept   24 Office Visit Vernon Figueroa MD [id number]   Showing recent visits within past 540 days and meeting all other requirements  Future Appointments  No visits were found meeting these conditions.  Showing future appointments within next 180 days and meeting all other requirements

## 2024-09-05 RX ORDER — ESCITALOPRAM OXALATE 5 MG/1
5 TABLET ORAL DAILY
Qty: 30 TABLET | Refills: 2 | Status: SHIPPED | OUTPATIENT
Start: 2024-09-05 | End: 2024-12-04

## 2024-09-20 DIAGNOSIS — E11.9 CONTROLLED TYPE 2 DIABETES MELLITUS WITHOUT COMPLICATION, WITHOUT LONG-TERM CURRENT USE OF INSULIN (MULTI): ICD-10-CM

## 2024-09-20 RX ORDER — DULAGLUTIDE 3 MG/.5ML
INJECTION, SOLUTION SUBCUTANEOUS
Qty: 2 ML | Refills: 0 | Status: SHIPPED | OUTPATIENT
Start: 2024-09-20

## 2024-09-20 NOTE — TELEPHONE ENCOUNTER
Rx Refill Request     Name: Modesto LEXI Callejas  :  1980     Date of last appointment:  2024   Date of next appointment:  Visit date not found   Best number to reach patient:  [phone]

## 2024-10-14 DIAGNOSIS — E11.9 CONTROLLED TYPE 2 DIABETES MELLITUS WITHOUT COMPLICATION, WITHOUT LONG-TERM CURRENT USE OF INSULIN (MULTI): ICD-10-CM

## 2024-10-14 RX ORDER — DULAGLUTIDE 3 MG/.5ML
INJECTION, SOLUTION SUBCUTANEOUS
Qty: 2 ML | Refills: 1 | Status: SHIPPED | OUTPATIENT
Start: 2024-10-14

## 2024-10-14 NOTE — TELEPHONE ENCOUNTER
Rx Refill Request Telephone Encounter    Name:  Modesto ELLIOTT Britta  :  [id number]    [facility]   Date of last appointment:  24  Date of next appointment:  N/A

## 2024-10-25 DIAGNOSIS — E11.9 TYPE 2 DIABETES MELLITUS WITHOUT COMPLICATION, UNSPECIFIED WHETHER LONG TERM INSULIN USE (MULTI): Primary | ICD-10-CM

## 2024-10-25 DIAGNOSIS — E66.01 CLASS 3 SEVERE OBESITY WITH BODY MASS INDEX (BMI) OF 45.0 TO 49.9 IN ADULT, UNSPECIFIED OBESITY TYPE, UNSPECIFIED WHETHER SERIOUS COMORBIDITY PRESENT: ICD-10-CM

## 2024-10-25 DIAGNOSIS — E66.813 CLASS 3 SEVERE OBESITY WITH BODY MASS INDEX (BMI) OF 45.0 TO 49.9 IN ADULT, UNSPECIFIED OBESITY TYPE, UNSPECIFIED WHETHER SERIOUS COMORBIDITY PRESENT: ICD-10-CM

## 2024-10-25 PROBLEM — I10 ESSENTIAL HYPERTENSION: Status: ACTIVE | Noted: 2020-11-09

## 2024-10-25 PROBLEM — G47.30 SLEEP APNEA: Status: ACTIVE | Noted: 2024-10-25

## 2024-10-25 PROBLEM — E78.2 MIXED HYPERLIPIDEMIA: Status: ACTIVE | Noted: 2024-10-25

## 2024-10-25 PROBLEM — F41.9 ANXIETY: Status: ACTIVE | Noted: 2024-10-25

## 2024-10-25 PROBLEM — Z72.0 TOBACCO ABUSE: Status: ACTIVE | Noted: 2024-10-25

## 2024-10-28 ENCOUNTER — TELEPHONE (OUTPATIENT)
Dept: PRIMARY CARE | Facility: CLINIC | Age: 44
End: 2024-10-28
Payer: COMMERCIAL

## 2024-10-28 DIAGNOSIS — E11.9 TYPE 2 DIABETES MELLITUS WITHOUT COMPLICATION, WITHOUT LONG-TERM CURRENT USE OF INSULIN (MULTI): Primary | ICD-10-CM

## 2024-11-27 DIAGNOSIS — E11.9 CONTROLLED TYPE 2 DIABETES MELLITUS WITHOUT COMPLICATION, WITHOUT LONG-TERM CURRENT USE OF INSULIN (MULTI): ICD-10-CM

## 2024-11-27 NOTE — TELEPHONE ENCOUNTER
Rx Refill Request     Name: Modesto LEXI Callejas  :  1980   Medication Name:  Trulicity 3 mg/0.5 mL pen injector   Specific Pharmacy location:  [facility] [address] [place] [address]   Date of last appointment:  2024   Date of next appointment:  Visit date not found   Best number to reach patient:  [phone]

## 2024-11-29 DIAGNOSIS — F41.9 ANXIETY: ICD-10-CM

## 2024-11-29 NOTE — TELEPHONE ENCOUNTER
Rx Refill Request     Name: Modesto Callejas  :  1980   Medication Name:      busPIRone (Buspar) 15 mg tablet     Specific Pharmacy location:  [facility] [address] [place] [address]   Date of last appointment:  2024   Date of next appointment:  Visit date not found   Best number to reach patient:  [phone]

## 2024-12-02 RX ORDER — BUSPIRONE HYDROCHLORIDE 15 MG/1
15 TABLET ORAL 2 TIMES DAILY
Qty: 60 TABLET | Refills: 0 | Status: SHIPPED | OUTPATIENT
Start: 2024-12-02

## 2024-12-05 RX ORDER — DULAGLUTIDE 3 MG/.5ML
INJECTION, SOLUTION SUBCUTANEOUS
Qty: 2 ML | Refills: 1 | Status: SHIPPED | OUTPATIENT
Start: 2024-12-05

## 2024-12-16 DIAGNOSIS — I10 PRIMARY HYPERTENSION: ICD-10-CM

## 2024-12-16 RX ORDER — LISINOPRIL 40 MG/1
40 TABLET ORAL DAILY
Qty: 90 TABLET | Refills: 3 | Status: SHIPPED | OUTPATIENT
Start: 2024-12-16

## 2024-12-16 NOTE — TELEPHONE ENCOUNTER
Rx Refill Request Telephone Encounter    Name:  Modesto Callejas  :  [id number]    Specific Pharmacy location:  [facility] pharmacy in [place]   Date of last appointment:  24  Date of next appointment:  N/A

## 2024-12-28 DIAGNOSIS — F41.9 ANXIETY: ICD-10-CM

## 2024-12-30 RX ORDER — BUSPIRONE HYDROCHLORIDE 15 MG/1
15 TABLET ORAL 2 TIMES DAILY
Qty: 60 TABLET | Refills: 0 | Status: SHIPPED | OUTPATIENT
Start: 2024-12-30

## 2024-12-30 RX ORDER — ESCITALOPRAM OXALATE 5 MG/1
5 TABLET ORAL DAILY
Qty: 30 TABLET | Refills: 2 | Status: SHIPPED | OUTPATIENT
Start: 2024-12-30 | End: 2025-03-30

## 2024-12-30 NOTE — TELEPHONE ENCOUNTER
Rx Refill Request     Name: Modesto LEXI Callejas  :  1980   Medication Name:  escitalopram (Lexapro) 5 mg tablet   Specific Pharmacy location:  [facility] [address] [place] [address]   Date of last appointment:  2024   Date of next appointment:  Visit date not found   Best number to reach patient:  [phone]

## 2025-01-29 DIAGNOSIS — F41.9 ANXIETY: ICD-10-CM

## 2025-01-29 RX ORDER — BUSPIRONE HYDROCHLORIDE 15 MG/1
15 TABLET ORAL 2 TIMES DAILY
Qty: 60 TABLET | Refills: 0 | Status: SHIPPED | OUTPATIENT
Start: 2025-01-29

## 2025-01-29 NOTE — TELEPHONE ENCOUNTER
Rx Refill Request     Name: Modesto Callejas  :  1980   Medication Name:  busPIRone (Buspar) 15 mg tablet   Specific Pharmacy location:  [facility] [address] [place] [address]   Date of last appointment:  2024   Date of next appointment:  Visit date not found   Best number to reach patient:  [phone]

## 2025-02-11 ENCOUNTER — TELEPHONE (OUTPATIENT)
Dept: PRIMARY CARE | Facility: CLINIC | Age: 45
End: 2025-02-11
Payer: COMMERCIAL

## 2025-02-11 DIAGNOSIS — E11.9 TYPE 2 DIABETES MELLITUS WITHOUT COMPLICATION, WITHOUT LONG-TERM CURRENT USE OF INSULIN (MULTI): ICD-10-CM

## 2025-02-11 NOTE — TELEPHONE ENCOUNTER
Patient currently prescribed trulicity but has not taken trulicity in 3 weeks. Patient states he saw no improvement in blood sugar readings and weight loss. Patient is inquiring if monjaro would be the next step and would be more beneficial. Please advise.

## 2025-02-13 ENCOUNTER — SPECIALTY PHARMACY (OUTPATIENT)
Dept: PHARMACY | Facility: CLINIC | Age: 45
End: 2025-02-13

## 2025-02-13 RX ORDER — TIRZEPATIDE 2.5 MG/.5ML
2.5 INJECTION, SOLUTION SUBCUTANEOUS
Qty: 2 ML | Refills: 0 | Status: SHIPPED | OUTPATIENT
Start: 2025-02-16

## 2025-03-04 ENCOUNTER — TELEPHONE (OUTPATIENT)
Dept: PRIMARY CARE | Facility: CLINIC | Age: 45
End: 2025-03-04
Payer: COMMERCIAL

## 2025-03-04 DIAGNOSIS — J01.10 ACUTE FRONTAL SINUSITIS, RECURRENCE NOT SPECIFIED: ICD-10-CM

## 2025-03-06 RX ORDER — AZITHROMYCIN 250 MG/1
TABLET, FILM COATED ORAL
Qty: 6 TABLET | Refills: 0 | Status: SHIPPED | OUTPATIENT
Start: 2025-03-06 | End: 2025-03-11

## 2025-03-14 ENCOUNTER — APPOINTMENT (OUTPATIENT)
Dept: PRIMARY CARE | Facility: CLINIC | Age: 45
End: 2025-03-14
Payer: COMMERCIAL

## 2025-03-23 DIAGNOSIS — F41.9 ANXIETY: ICD-10-CM

## 2025-03-24 RX ORDER — BUSPIRONE HYDROCHLORIDE 15 MG/1
15 TABLET ORAL 2 TIMES DAILY
Qty: 60 TABLET | Refills: 0 | OUTPATIENT
Start: 2025-03-24

## 2025-03-24 NOTE — TELEPHONE ENCOUNTER
Rx Refill Request     Name: Modesto LEXI Callejas  :  1980     Date of last appointment:  2024   Date of next appointment:  Visit date not found   Best number to reach patient:  [phone]

## 2025-04-13 DIAGNOSIS — F41.9 ANXIETY: ICD-10-CM

## 2025-04-14 ENCOUNTER — TELEPHONE (OUTPATIENT)
Dept: PRIMARY CARE | Facility: CLINIC | Age: 45
End: 2025-04-14

## 2025-04-14 RX ORDER — BUSPIRONE HYDROCHLORIDE 15 MG/1
15 TABLET ORAL 2 TIMES DAILY
Qty: 30 TABLET | Refills: 0 | Status: SHIPPED | OUTPATIENT
Start: 2025-04-14 | End: 2025-04-29

## 2025-04-14 RX ORDER — BUSPIRONE HYDROCHLORIDE 15 MG/1
15 TABLET ORAL 2 TIMES DAILY
Qty: 60 TABLET | Refills: 0 | OUTPATIENT
Start: 2025-04-14

## 2025-04-14 NOTE — TELEPHONE ENCOUNTER
We received a request for prior authorization on the patient's Trulicity 3 mg/0.5 mL pen injector  from their pharmacy. Prior authorization was submitted to insurance today. We will await their determination.

## 2025-04-14 NOTE — TELEPHONE ENCOUNTER
Patient scheduled on 4/28/2025 soonest appointment    Would like a short supply to get him to his appointment

## 2025-04-14 NOTE — TELEPHONE ENCOUNTER
Rx Refill Request     Name: Modesto Callejas  :  1980   Medication Name:  busPIRone (Buspar) 15 mg tablet   Specific Pharmacy location:  [facility] [address] [place] [address]   Date of last appointment:  2024   Date of next appointment:  Visit date not found   Best number to reach patient:  [phone]

## 2025-04-16 PROBLEM — E11.9 TYPE 2 DIABETES MELLITUS WITHOUT COMPLICATION, WITHOUT LONG-TERM CURRENT USE OF INSULIN: Status: ACTIVE | Noted: 2025-04-16

## 2025-04-16 RX ORDER — BLOOD-GLUCOSE SENSOR
EACH MISCELLANEOUS
COMMUNITY
Start: 2024-07-06

## 2025-04-16 RX ORDER — LANCETS 30 GAUGE
EACH MISCELLANEOUS
COMMUNITY
Start: 2024-07-06

## 2025-04-16 NOTE — TELEPHONE ENCOUNTER
Prior Authorization  has been DENIED.     Our Step Therapy prior authorization criteria have not been met. Step Therapy means that you tried other drugs, and they did not work for you or that there are clinical reasons why you cannot try these drugs. From the records that we have received, Trulicity was denied for these reasons: 1) Records did not show that Metformin has been tried and did not work for you or that there are clinical reasons why this drug cannot be tried. Records did not show that you had side effects or had other reasons why some other drug could not be used. Since step therapy has not been met, we are not able to approve. Please look at our list of covered drugs, also known as the formulary, to see what is covered

## 2025-04-16 NOTE — PROGRESS NOTES
Subjective   Patient ID: Modesto Callejas is a 44 y.o. male who presents for No chief complaint on file..  HPI    Review of Systems    Objective   There were no vitals taken for this visit.   Physical Exam    Assessment/Plan   Problem List Items Addressed This Visit    None      Patient education provided.  Stay current with age appropriate health maintenance as instructed.  Appointment here or ER with new or worsening symptoms'  Keep appropriate follow-up visit.  Stay current with proper immunizations

## 2025-04-16 NOTE — TELEPHONE ENCOUNTER
[employer]PA failed to produce a response.  Forms filled out and faxed.  Awaiting response and confirmation.

## 2025-04-28 ENCOUNTER — APPOINTMENT (OUTPATIENT)
Dept: PRIMARY CARE | Facility: CLINIC | Age: 45
End: 2025-04-28

## 2025-05-12 DIAGNOSIS — F41.9 ANXIETY: Primary | ICD-10-CM

## 2025-05-12 RX ORDER — BUSPIRONE HYDROCHLORIDE 15 MG/1
15 TABLET ORAL 2 TIMES DAILY
Qty: 30 TABLET | Refills: 3 | OUTPATIENT
Start: 2025-05-12 | End: 2025-05-27

## 2025-05-28 NOTE — PROGRESS NOTES
Subjective   Patient ID: Modesto Callejas is a 44 y.o. male who presents for Follow-up.  HPI  Diabetes with improving control his A1c was tested today and was 6.6 which is down from previous at 7.4 when last tested. Patient was started on Mounjaro 2.5 mg via office  samples a on 2/11/2025 which is likely responsible for the improvement. Insurance not covering. We will resubmit PA. Goal for patient under 6.5%.   Per Endocrinology notes obtained from [facility] today. Previous failed medication history  was Metformin 500 mg BID, Trulicity 4.5 mg weekly, Humalog 8 units with meals.   He is watching his diet   Recommend losing weight    High cholesterol stable watch diet probable work    Hypertension with slight elevation of systolic today  No chest pain or shortness of breath  Lifestyle modification risk factor modification    Patient smokes and should quit discussed risks    Anxiety stable medicine helpful  No suicidal ideation no psychotic manic symptoms    Moderate Sleep apnea ongoing recommend sleep apnea treatment    Obese recommend weight loss and diet    Review of Systems   Constitutional:  Negative for activity change and fatigue.   HENT:  Negative for congestion and sore throat.    Eyes:  Negative for discharge.   Respiratory:  Negative for cough, chest tightness and shortness of breath.    Cardiovascular:  Negative for chest pain and leg swelling.   Gastrointestinal:  Negative for abdominal pain, blood in stool, constipation, diarrhea, nausea and vomiting.   Endocrine: Negative for cold intolerance and heat intolerance.   Genitourinary:  Negative for difficulty urinating and hematuria.   Musculoskeletal:  Negative for arthralgias, back pain, gait problem, myalgias and neck pain.   Allergic/Immunologic: Negative for environmental allergies.   Neurological:  Negative for dizziness, syncope, weakness, numbness and headaches.   Hematological:  Negative for adenopathy. Does not bruise/bleed easily.  "  Psychiatric/Behavioral:  Negative for dysphoric mood. The patient is not nervous/anxious.    All other systems reviewed and are negative.      Objective   /84 (BP Location: Right arm, BP Cuff Size: Large adult)   Pulse 69   Ht 1.702 m (5' 7\")   Wt 117 kg (257 lb 12.8 oz)   SpO2 96%   BMI 40.38 kg/m²    Physical Exam  Vitals and nursing note reviewed.   Constitutional:       General: He is not in acute distress.     Appearance: Normal appearance. He is obese.   HENT:      Head: Normocephalic and atraumatic.      Right Ear: Tympanic membrane, ear canal and external ear normal.      Left Ear: Tympanic membrane, ear canal and external ear normal.      Nose: Nose normal.      Mouth/Throat:      Mouth: Mucous membranes are moist.      Pharynx: Oropharynx is clear. No oropharyngeal exudate or posterior oropharyngeal erythema.   Eyes:      Extraocular Movements: Extraocular movements intact.      Conjunctiva/sclera: Conjunctivae normal.      Pupils: Pupils are equal, round, and reactive to light.   Cardiovascular:      Rate and Rhythm: Normal rate and regular rhythm.      Pulses: Normal pulses.      Heart sounds: Normal heart sounds. No murmur heard.  Pulmonary:      Effort: Pulmonary effort is normal. No respiratory distress.      Breath sounds: Normal breath sounds. No wheezing or rales.   Abdominal:      General: Abdomen is flat. Bowel sounds are normal. There is no distension.      Palpations: Abdomen is soft. There is no mass.      Tenderness: There is no abdominal tenderness.   Musculoskeletal:         General: No swelling or deformity. Normal range of motion.      Cervical back: Normal range of motion and neck supple.      Right lower leg: No edema.      Left lower leg: No edema.   Lymphadenopathy:      Cervical: No cervical adenopathy.   Skin:     General: Skin is warm and dry.      Capillary Refill: Capillary refill takes less than 2 seconds.      Findings: No lesion or rash.   Neurological:      " General: No focal deficit present.      Mental Status: He is alert and oriented to person, place, and time.      Cranial Nerves: No cranial nerve deficit.      Motor: No weakness.   Psychiatric:         Mood and Affect: Mood normal.         Behavior: Behavior normal.         Thought Content: Thought content normal.         Judgment: Judgment normal.         Assessment/Plan   Problem List Items Addressed This Visit       Anxiety    Class 3 severe obesity due to excess calories with serious comorbidity and body mass index (BMI) of 40.0 to 44.9 in adult    Mixed hyperlipidemia    Relevant Orders    Lipid Panel    Obstructive sleep apnea syndrome    Primary hypertension    Relevant Orders    Albumin-Creatinine Ratio, Urine Random    Tobacco abuse    Type 2 diabetes mellitus without complication, without long-term current use of insulin - Primary    Relevant Orders    Comprehensive Metabolic Panel    CBC and Auto Differential    Hemoglobin A1C    Follow Up In [facility] - PCP    Albumin-Creatinine Ratio, Urine Random    POCT glycosylated hemoglobin (Hb A1C) manually resulted (Completed)       Patient education provided.  Stay current with age appropriate health maintenance as instructed.  Appointment here or ER with new or worsening symptoms'  Keep appropriate follow-up visit.  Stay current with proper immunizations   Weight loss and diet  Testing as above  Recheck 3 months and as needed  Continue current medicines  Monitor blood pressure

## 2025-05-30 ENCOUNTER — APPOINTMENT (OUTPATIENT)
Dept: PRIMARY CARE | Facility: CLINIC | Age: 45
End: 2025-05-30
Payer: COMMERCIAL

## 2025-05-30 VITALS
OXYGEN SATURATION: 96 % | HEART RATE: 69 BPM | WEIGHT: 257.8 LBS | DIASTOLIC BLOOD PRESSURE: 84 MMHG | HEIGHT: 67 IN | BODY MASS INDEX: 40.46 KG/M2 | SYSTOLIC BLOOD PRESSURE: 145 MMHG

## 2025-05-30 DIAGNOSIS — G47.33 OBSTRUCTIVE SLEEP APNEA SYNDROME: ICD-10-CM

## 2025-05-30 DIAGNOSIS — E66.813 CLASS 3 SEVERE OBESITY DUE TO EXCESS CALORIES WITH SERIOUS COMORBIDITY AND BODY MASS INDEX (BMI) OF 40.0 TO 44.9 IN ADULT: ICD-10-CM

## 2025-05-30 DIAGNOSIS — F41.9 ANXIETY: ICD-10-CM

## 2025-05-30 DIAGNOSIS — E11.9 TYPE 2 DIABETES MELLITUS WITHOUT COMPLICATION, WITHOUT LONG-TERM CURRENT USE OF INSULIN: Primary | ICD-10-CM

## 2025-05-30 DIAGNOSIS — Z72.0 TOBACCO ABUSE: ICD-10-CM

## 2025-05-30 DIAGNOSIS — I10 PRIMARY HYPERTENSION: ICD-10-CM

## 2025-05-30 DIAGNOSIS — E78.2 MIXED HYPERLIPIDEMIA: ICD-10-CM

## 2025-05-30 LAB — POC HEMOGLOBIN A1C: 6.6 % (ref 4.2–6.5)

## 2025-05-30 PROCEDURE — 4010F ACE/ARB THERAPY RXD/TAKEN: CPT | Performed by: FAMILY MEDICINE

## 2025-05-30 PROCEDURE — 99214 OFFICE O/P EST MOD 30 MIN: CPT | Performed by: FAMILY MEDICINE

## 2025-05-30 PROCEDURE — 83036 HEMOGLOBIN GLYCOSYLATED A1C: CPT | Performed by: FAMILY MEDICINE

## 2025-05-30 PROCEDURE — 3079F DIAST BP 80-89 MM HG: CPT | Performed by: FAMILY MEDICINE

## 2025-05-30 PROCEDURE — 3077F SYST BP >= 140 MM HG: CPT | Performed by: FAMILY MEDICINE

## 2025-05-30 PROCEDURE — 3008F BODY MASS INDEX DOCD: CPT | Performed by: FAMILY MEDICINE

## 2025-05-30 PROCEDURE — 3044F HG A1C LEVEL LT 7.0%: CPT | Performed by: FAMILY MEDICINE

## 2025-05-30 ASSESSMENT — ENCOUNTER SYMPTOMS
COUGH: 0
ADENOPATHY: 0
VOMITING: 0
SHORTNESS OF BREATH: 0
BACK PAIN: 0
HEADACHES: 0
NERVOUS/ANXIOUS: 0
ACTIVITY CHANGE: 0
DIZZINESS: 0
MYALGIAS: 0
ARTHRALGIAS: 0
BRUISES/BLEEDS EASILY: 0
DYSPHORIC MOOD: 0
NECK PAIN: 0
ABDOMINAL PAIN: 0
FATIGUE: 0
EYE DISCHARGE: 0
BLOOD IN STOOL: 0
WEAKNESS: 0
NAUSEA: 0
HEMATURIA: 0
CHEST TIGHTNESS: 0
DIARRHEA: 0
DIFFICULTY URINATING: 0
NUMBNESS: 0
CONSTIPATION: 0
SORE THROAT: 0

## 2025-05-30 NOTE — TELEPHONE ENCOUNTER
Patient was suppose to have refills of Buspar and Lisinopril sent in at his appointment today. RX pended.     Lisinopril 90 day to mail in   And 30 day to local.

## 2025-05-31 RX ORDER — LISINOPRIL 40 MG/1
40 TABLET ORAL DAILY
Qty: 30 TABLET | Refills: 0 | Status: SHIPPED | OUTPATIENT
Start: 2025-05-31

## 2025-05-31 RX ORDER — BUSPIRONE HYDROCHLORIDE 15 MG/1
15 TABLET ORAL 2 TIMES DAILY
Qty: 60 TABLET | Refills: 0 | Status: SHIPPED | OUTPATIENT
Start: 2025-05-31 | End: 2025-06-30

## 2025-05-31 RX ORDER — LISINOPRIL 40 MG/1
40 TABLET ORAL DAILY
Qty: 90 TABLET | Refills: 2 | Status: SHIPPED | OUTPATIENT
Start: 2025-05-31

## 2025-06-01 DIAGNOSIS — F41.9 ANXIETY: ICD-10-CM

## 2025-06-02 ENCOUNTER — SPECIALTY PHARMACY (OUTPATIENT)
Dept: PHARMACY | Facility: CLINIC | Age: 45
End: 2025-06-02

## 2025-06-02 ENCOUNTER — PHARMACY VISIT (OUTPATIENT)
Dept: PHARMACY | Facility: CLINIC | Age: 45
End: 2025-06-02
Payer: COMMERCIAL

## 2025-06-02 PROCEDURE — RXMED WILLOW AMBULATORY MEDICATION CHARGE

## 2025-06-02 RX ORDER — ESCITALOPRAM OXALATE 5 MG/1
5 TABLET ORAL DAILY
Qty: 30 TABLET | Refills: 2 | Status: SHIPPED | OUTPATIENT
Start: 2025-06-02 | End: 2025-08-31

## 2025-06-02 NOTE — TELEPHONE ENCOUNTER
Rx Refill Request     Name: Modesto Callejas  :  1980   Date of last appointment:  2025   Date of next appointment:  2025   Best number to reach patient:  [phone]

## 2025-06-03 PROCEDURE — RXMED WILLOW AMBULATORY MEDICATION CHARGE

## 2025-06-04 ENCOUNTER — TELEPHONE (OUTPATIENT)
Dept: PRIMARY CARE | Facility: CLINIC | Age: 45
End: 2025-06-04
Payer: COMMERCIAL

## 2025-06-04 DIAGNOSIS — F41.9 ANXIETY: ICD-10-CM

## 2025-06-04 NOTE — TELEPHONE ENCOUNTER
Patient requesting refills on buspar, to be sent to [facility] for 90 day supply.

## 2025-06-04 NOTE — TELEPHONE ENCOUNTER
Prior Authorization for tirzepatide (Mounjaro) 2.5 mg/0.5 mL pen injector  has been APPROVED.    Approval valid through [employer].  Approval letter scanned into media on 06.04.2025

## 2025-06-05 ENCOUNTER — PHARMACY VISIT (OUTPATIENT)
Dept: PHARMACY | Facility: CLINIC | Age: 45
End: 2025-06-05
Payer: COMMERCIAL

## 2025-06-05 PROCEDURE — RXMED WILLOW AMBULATORY MEDICATION CHARGE

## 2025-06-05 RX ORDER — BUSPIRONE HYDROCHLORIDE 15 MG/1
15 TABLET ORAL 2 TIMES DAILY
Qty: 180 TABLET | Refills: 0 | Status: SHIPPED | OUTPATIENT
Start: 2025-06-05 | End: 2025-09-03

## 2025-06-06 ENCOUNTER — SPECIALTY PHARMACY (OUTPATIENT)
Dept: PHARMACY | Facility: CLINIC | Age: 45
End: 2025-06-06

## 2025-06-09 ENCOUNTER — PHARMACY VISIT (OUTPATIENT)
Dept: PHARMACY | Facility: CLINIC | Age: 45
End: 2025-06-09
Payer: COMMERCIAL

## 2025-06-20 DIAGNOSIS — E11.9 TYPE 2 DIABETES MELLITUS WITHOUT COMPLICATION, WITHOUT LONG-TERM CURRENT USE OF INSULIN: ICD-10-CM

## 2025-06-20 RX ORDER — TIRZEPATIDE 2.5 MG/.5ML
2.5 INJECTION, SOLUTION SUBCUTANEOUS
Qty: 2 ML | Refills: 1 | Status: SHIPPED | OUTPATIENT
Start: 2025-06-22

## 2025-06-20 NOTE — TELEPHONE ENCOUNTER
Rx Refill Request     Name: Modesto Callejas  :  1980   Date of last appointment:  2025   Date of next appointment:  2025   Best number to reach patient:  [phone]

## 2025-06-23 PROCEDURE — RXMED WILLOW AMBULATORY MEDICATION CHARGE

## 2025-06-24 DIAGNOSIS — E11.9 TYPE 2 DIABETES MELLITUS WITHOUT COMPLICATION, WITHOUT LONG-TERM CURRENT USE OF INSULIN: Primary | ICD-10-CM

## 2025-06-25 ENCOUNTER — PHARMACY VISIT (OUTPATIENT)
Dept: PHARMACY | Facility: CLINIC | Age: 45
End: 2025-06-25
Payer: COMMERCIAL

## 2025-06-27 ENCOUNTER — TELEMEDICINE (OUTPATIENT)
Dept: PHARMACY | Facility: HOSPITAL | Age: 45
End: 2025-06-27
Payer: COMMERCIAL

## 2025-06-27 DIAGNOSIS — E11.9 TYPE 2 DIABETES MELLITUS WITHOUT COMPLICATION, WITHOUT LONG-TERM CURRENT USE OF INSULIN: Primary | ICD-10-CM

## 2025-06-27 RX ORDER — TIRZEPATIDE 5 MG/.5ML
5 INJECTION, SOLUTION SUBCUTANEOUS WEEKLY
Qty: 2 ML | Refills: 3 | Status: SHIPPED | OUTPATIENT
Start: 2025-06-27

## 2025-06-27 RX ORDER — LANCETS
EACH MISCELLANEOUS
Qty: 100 EACH | Refills: 11 | Status: SHIPPED | OUTPATIENT
Start: 2025-06-27

## 2025-06-27 RX ORDER — POLYETHYLENE GLYCOL 3350 17 G/17G
POWDER, FOR SOLUTION ORAL
Start: 2025-06-27

## 2025-06-27 RX ORDER — BLOOD SUGAR DIAGNOSTIC
STRIP MISCELLANEOUS
Qty: 100 EACH | Refills: 11 | Status: SHIPPED | OUTPATIENT
Start: 2025-06-27

## 2025-06-27 RX ORDER — BLOOD-GLUCOSE METER
EACH MISCELLANEOUS
Qty: 1 EACH | Refills: 0 | Status: SHIPPED | OUTPATIENT
Start: 2025-06-27

## 2025-06-27 NOTE — Clinical Note
Employee $0 Diabetes Meds/Supplies Program (VBID) visit completed. Concerns: none. Continues to do well on Mounjaro. Will increase to 5 mg to continue improvement of glycemic control, weight loss. FUV w/ clinical pharmacy 1 mo for assessment.

## 2025-06-27 NOTE — PATIENT INSTRUCTIONS
To help with your constipation, please start taking either a stool softener or Miralax once daily. If needed, you may combine the two medications for gentle relief. These medications work over time, in about 12-24 hours, so they are gentle on your digestive system.     Instructions:  Docusate 50 mg - take one capsule (50 mg) by mouth once daily for constipation.  Adjust according to your bowel movements.  If your stool is too soft, take every other day.  If still having constipation, you may take up to two capsules (100 mg) up to twice daily as needed for constipation.   Do not exceed total daily dose of 200 mg without discussing with your healthcare provider(s).  [id number] (Miralax) powder - Mix 17 g (one capful) in 4 to 8 oz of any liquid once daily as needed for constipation.  Adjust according to your bowel movements.   If your stool is too soft, take every other day.  If still having constipation, you may increase to 17 g (one capful) twice daily.  Do not exceed two doses a day without discussing with your healthcare provider(s).       Thank you for entrusting your care to the Clinical Pharmacy Team! We look forward to seeing you again soon.  Please call your clinical pharmacist with any questions or concerns.    You are enrolled in the [facility] Employee Value Based Insurance Design (VBID) program. This program allows you to receive for $0 co-pays on diabetes medications/supplies.  To maintain your eligibility for this program, you must:  Maintain  employee insurance coverage  Fill prescriptions at a  pharmacy for pick-up or home delivery  Complete a visit with a clinical pharmacist at least once annually    Tiffanie James, PharmD  P: [phone]    To schedule an appointment, please contact:  P: [phone]

## 2025-06-27 NOTE — PROGRESS NOTES
Clinical Pharmacy Appointment  [facility] (VBID)    Patient ID: Modesto Callejas is a 45 y.o. male who presents to Clinical Pharmacy Team to complete a comprehensive medication review (CMR) with a pharmacist as part of the Value Based Insurance Design (VBID) diabetes program. Pharmacy team may also provide assistance in diabetes management per discussion with referring provider and/or endocrinology. Referring Provider: Vernon Figueroa MD    DM Provider: PCP, Vernon Figueroa MD  Last visit: 5/30/25, Next visit: 9/4/25    [facility] Employee Diabetes Program Enrollment: New Enrollment  Patient enrolled in  Employee diabetes program for $0 co-pays on diabetes medications/supplies. Enrollment anticipated to be active in 2-4 weeks and will be valid for one year, provided patient meets requirements; remains on  prescription insurance plan, fills eligible medications at  pharmacy, completes annual visit with clinical pharmacy team.   Requested ID enrollment date: 6/24/25  PharmD Management Level: Level 2   Pharmacy fill location:  Specialty    Subjective   HPI  DIABETES MELLITUS TYPE 2:    Diagnosed with diabetes: > 5 years  Disease course: improving, last A1c = 6.6% on 5/30/25, goal A1c < 6.5%  Eye exam/Optometry: unknown  Foot exam/Podiatry: unknown    PERTINENT PMH REVIEW:  HTN, HLD, obesity III, SOHEILA, tobacco dependence  Negatives: ASCVD, UTIs, retinopathy, MTC, MEN2, pancreatitis    SOCIAL DRIVERS OF HEALTH  Lifestyle  Diet: 2 meals/day. Breakfast in AM, dinner in PM  Watching carbs, portions - appetite decreased w/ Mounjaro    Barriers to Adherence  Adherence/Organization: no concerns  Affordability/Accessibility: enrolled in [facility] $[facility] DM copay program  Side effects:   Appetite suppression - positive effect, helping decrease caloric intake, but still able to maintain adequate PO intake  Some constipation - was previously very regular, BM daily; however, now  every 2-3 days. Denies pain, GI upset, painful BM, etc.     HOME MONITORING  Blood Glucose  Monitoring Device: Fingerstick glucometer - unknown brand, reports using spouse's meter   Monitoring Frequency:  PRN     Current Readings:  BG log not present at today's visit, denies hypo/hyperglycemic s/sx    Previous:  N/a    Hypoglycemia  Events? denies  Awareness? unknown      Objective   Allergies[1]  Social History     Social History Narrative    Not on file      Vitals  BP Readings from Last 2 Encounters:   05/30/25 145/84   05/17/24 120/70     Labs  A1C  Lab Results   Component Value Date    HGBA1C 6.6 (A) 05/30/2025    HGBA1C 7.4 (A) 07/21/2023    HGBA1C 6.8 (A) 05/25/2022     BMP  Lab Results   Component Value Date    CALCIUM 9.6 07/21/2023     (L) 07/21/2023    K 4.3 07/21/2023    CO2 25 07/21/2023     07/21/2023    BUN 16 07/21/2023    CREATININE 0.87 07/21/2023     LFTs  Lab Results   Component Value Date    ALT 46 07/21/2023    AST 23 07/21/2023    ALKPHOS 50 07/21/2023    BILITOT 0.4 07/21/2023     FLP  Lab Results   Component Value Date    TRIG 228 (H) 07/21/2023    CHOL 192 07/21/2023    LDLF 110 (H) 07/21/2023    HDL 36.9 (A) 07/21/2023     Urine Microalbumin  Lab Results   Component Value Date    MICROALBCREA 22.7 07/21/2023     Weight Management  Wt Readings from Last 3 Encounters:   05/30/25 117 kg (257 lb 12.8 oz)   05/17/24 130 kg (287 lb 6.4 oz)      There is no height or weight on file to calculate BMI.   Medication Review  Current Outpatient Medications   Medication Instructions    Accu-Chek Guide Glucose Meter misc Use as directed to test blood glucose up to four times daily as needed.    Accu-Chek Guide test strips Use new test strip with meter each time to test blood glucose up to four times daily as needed.    Accu-Chek Softclix Lancets misc Use new lancet with lancing device to test blood sugar up to four times daily.    busPIRone (BUSPAR) 15 mg, oral, 2 times daily    docusate  sodium (Colace) 50 mg capsule Take one to two capsules by mouth once to twice daily as needed for constipation as directed. Up to 4 capsules daily (200 mg). Do not exceed 200 mg daily without discussing with healthcare provider.    escitalopram (LEXAPRO) 5 mg, oral, Daily    lisinopril 40 mg, oral, Daily    Mounjaro 5 mg, subcutaneous, Weekly    polyethylene glycol (Miralax) 17 gram/dose powder Dissolve 17 g (one capful) in 4-8 oz of any fluid and drink by mouth once to twice daily as needed for constipation.      MEDICATION RECONCILIATION  Added: none  Changed: none  Removed:    Trulicity - alt therapy   Dexcom - no longer using   Lisinopril - duplicate order, still taking    Drug Interactions:  None warranting change in therapy at this time    CURRENT DIABETES PHARMACOTHERAPY  Mounjaro 2.5 injected subcutaneously once weekly    HISTORICAL PHARMACOTHERAPY  Metformin   Trulicity 4.5 mg once weekly  Insulin - Humalog    Comorbidity/Complications Regimen: needs optimization  Lipids:   Statin? no  LDL: not at goal (< 70 mg/dL) 2023, due  BP:  BP: not at goal, < 130/80  ACE/ARB? yes  Renal:   eGFR = >= 90 mL/min/BSA (normal) 2023, due  UACR: normal (< 30 mg/g) 2023, due  ASCVD: primary prevention  Aspirin? no  _______________________________________________________________________    EDUCATION    Mounjaro Education:     Counseled patient on Mounjaro MOA, expectations, side effects, duration of therapy, administration, and monitoring parameters.  Provided detailed dosing and administration counseling to ensure proper technique.   Reviewed Mounjaro titration schedule, starting with 2.5 mg once weekly to a goal of 15 mg once weekly if tolerated  Counseled patient on the benefits of GLP-1ra glycemic control and weight loss  Reviewed storage requirements of Mounjaro when not in use, and when to administer the medication if a dose is missed.  Advised patient that they may experience improved satiety after meals and portion  sizes of meals may be reduced as doses of Mounjaro increase.    DISCUSSION/NOTES    Initial visit to [facility] w/ clinical pharmacy, enrollment visit for VBID $0 copay program. Discussed program requirements. Pt expresses understanding.  T2DM  Improving. PCP transitioned pt from Trulicity to Mounjaro 2/2025, glycemic control has improved since then. Recently resubmitted PA for Mounjaro, approved.   Pt has stricter target A1c of < 6.5%  Reports tolerating Mounjaro well, some constipation.  Discussed OTC remedies to relieve constipation and keep BM regular. Recommended adequate water intake, adding stool softener and/or Miralax.   Pt inquired about increasing dose of Mounjaro to aid in appetite suppression lasting for whole week, continued weight loss, continued glycemic control.   Reviewed MOA, possible side effects, expectations w/ therapy, etc of Mounjaro.   As pt has been on 2.5 mg dose for several months and tolerating well, will titrate to 5 mg dose w/ close follow up w/ clinical pharmacy 1 month to assess tolerability/efficacy. Pt given [facility] contact info for any questions/concerns in interim.  Comorbidity/complication regimen:  Overdue for routine labs. Recommend prior to next PCP visit 9/2025.  At last measure, lipids not at goal. Recommend repeat lab and initiation of mod-high intensity statin to target LDL < 70 mg/dL.  Obesity - continue Mounjaro, lifestyle modification to reduce weight and decrease risk of complications of obesity.       Assessment/Plan   Problem List Items Addressed This Visit       Type 2 diabetes mellitus without complication, without long-term current use of insulin - Primary    Relevant Medications    docusate sodium (Colace) 50 mg capsule    polyethylene glycol (Miralax) 17 gram/dose powder    tirzepatide (Mounjaro) 5 mg/0.5 mL pen injector    Accu-Chek Softclix Lancets misc    Accu-Chek Guide test strips    Accu-Chek Guide Glucose Meter misc    Other Relevant Orders    Referral to  Clinical Pharmacy     Diabetes Mellitus: Type 2  Improving, last A1c = 6.6% on 5/30/25, goal A1c < 6.5%  Overall, doing well on current regimen, reports mild constipation, but otherwise tolerating very well.  As pt has been on therapy for several months and tolerating well, will increase to 5 mg once weekly.  CHANGE  Increase Mounjaro to 5 mg injected subcutaneously once weekly - anticipated 7/1/25  Pt to use two pens of 2.5 mg injected into separate injection sites on scheduled injection days (Tuesdays) every 7 days. To use up current supply of 2.5 mg.     Constipation secondary to GLP1/GIP  Recommended adding daily stool softener +/- Miralax to keep bowel movements consistent. Pt to contact provider if OTC interventions do not improve sx or if any abdominal pain presents.   Docusate 50 mg - take one to two caps (50 to 100 mg) PO once or twice daily as needed for constipation related to GLP-1  [id number] (Miralax) - take 17 g (one capful) once or twice daily as needed for constipation related to GLP1    Comorbidity/Complication Regimen: needs optimization  Due for annual labs. Recommend prior to next PCP visit.  Recommend initiation of mod-high intensity statin to target LDL goal < 70 mg/dL.   Continue weight loss.     Clinical Pharmacist Follow Up: 1 mo    Tiffanie James, PharmD   Clinical Pharmacist  [phone]    Continue all meds under the continuation of care with the referring provider and clinical pharmacy team. Verbal consent to manage patient's drug therapy was obtained from the patient. They were informed they may decline to participate or withdraw from participation in pharmacy services at any time.         [1]   Allergies  Allergen Reactions    Atorvastatin Other     myalgias    Rosuvastatin Other

## 2025-06-30 PROCEDURE — RXMED WILLOW AMBULATORY MEDICATION CHARGE

## 2025-07-01 ENCOUNTER — SPECIALTY PHARMACY (OUTPATIENT)
Dept: PHARMACY | Facility: CLINIC | Age: 45
End: 2025-07-01

## 2025-07-02 ENCOUNTER — PHARMACY VISIT (OUTPATIENT)
Dept: PHARMACY | Facility: CLINIC | Age: 45
End: 2025-07-02
Payer: COMMERCIAL

## 2025-07-14 PROCEDURE — RXMED WILLOW AMBULATORY MEDICATION CHARGE

## 2025-07-22 ENCOUNTER — SPECIALTY PHARMACY (OUTPATIENT)
Dept: PHARMACY | Facility: CLINIC | Age: 45
End: 2025-07-22

## 2025-07-24 ENCOUNTER — PHARMACY VISIT (OUTPATIENT)
Dept: PHARMACY | Facility: CLINIC | Age: 45
End: 2025-07-24
Payer: COMMERCIAL

## 2025-07-25 ENCOUNTER — APPOINTMENT (OUTPATIENT)
Dept: PHARMACY | Facility: HOSPITAL | Age: 45
End: 2025-07-25
Payer: COMMERCIAL

## 2025-07-25 DIAGNOSIS — E11.9 TYPE 2 DIABETES MELLITUS WITHOUT COMPLICATION, WITHOUT LONG-TERM CURRENT USE OF INSULIN: Primary | ICD-10-CM

## 2025-07-25 NOTE — PROGRESS NOTES
Clinical Pharmacy Appointment  [facility] (ID)    Patient ID: Modesto Callejas is a 45 y.o. male who presents for diabetes as part of the [employer] ([employer]) diabetes program. Referring Provider: Vernon Figueroa MD    DM Provider: PCP, Vernon Figueroa MD  Last visit: 5/30/25, Next visit: 9/4/25    [facility] (formerly [facility]) Employee Diabetes Program Enrollment: Up To Date  Enrollment date: 6/24/25    Summary of Last Visit/Interval History:  6/27/25 Initial visit to [facility] w[facility]  Increased Mounjaro to 5 mg once weekly    Subjective   HPI  DIABETES MELLITUS TYPE 2:    Diagnosed with diabetes: > 5 years  Disease course: improving, last A1c = 6.6% on 5/30/25, goal A1c < 6.5%  Eye exam/Optometry: unknown  Foot exam/Podiatry: unknown    PERTINENT PMH REVIEW:  HTN, HLD, obesity III, SOHEILA, tobacco dependence  Negatives: ASCVD, UTIs, retinopathy, MTC, MEN2, pancreatitis    SOCIAL DRIVERS OF HEALTH  Lifestyle  Diet: 2 meals/day. Breakfast in AM, dinner in PM  Watching carbs, portions - appetite decreased w/ Mounjaro    Barriers to Adherence  Adherence/Organization: no concerns  Affordability/Accessibility: enrolled in [facility] $0 DM copay program  Side effects: denies concerns    HOME MONITORING  Blood Glucose  Monitoring Device: Fingerstick glucometer - unknown brand, reports using spouse's meter   Monitoring Frequency:  PRN     Current Readings:  BG log not present at today's visit, denies hypo/hyperglycemic s/sx    Previous:  N/a    Hypoglycemia  Events? denies  Awareness? unknown      Objective   Allergies[1]  Social History     Social History Narrative    Not on file      Vitals  BP Readings from Last 2 Encounters:   05/30/25 145/84   05/17/24 120/70     Labs  A1C  Lab Results   Component Value Date    HGBA1C 6.6 (A) 05/30/2025    HGBA1C 7.4 (A) 07/21/2023    HGBA1C 6.8 (A) 05/25/2022     BMP  Lab Results    Component Value Date    CALCIUM 9.6 07/21/2023     (L) 07/21/2023    K 4.3 07/21/2023    CO2 25 07/21/2023     07/21/2023    BUN 16 07/21/2023    CREATININE 0.87 07/21/2023     LFTs  Lab Results   Component Value Date    ALT 46 07/21/2023    AST 23 07/21/2023    ALKPHOS 50 07/21/2023    BILITOT 0.4 07/21/2023     FLP  Lab Results   Component Value Date    TRIG 228 (H) 07/21/2023    CHOL 192 07/21/2023    LDLF 110 (H) 07/21/2023    HDL 36.9 (A) 07/21/2023     Urine Microalbumin  Lab Results   Component Value Date    MICROALBCREA 22.7 07/21/2023     Weight Management  Wt Readings from Last 3 Encounters:   05/30/25 117 kg (257 lb 12.8 oz)   05/17/24 130 kg (287 lb 6.4 oz)      There is no height or weight on file to calculate BMI.   Medication Review  Current Outpatient Medications   Medication Instructions    Accu-Chek Guide Glucose Meter misc Use as directed to test blood glucose up to four times daily as needed.    Accu-Chek Guide test strips Use new test strip with meter each time to test blood glucose up to four times daily as needed.    Accu-Chek Softclix Lancets misc Use new lancet with lancing device to test blood sugar up to four times daily.    busPIRone (BUSPAR) 15 mg, oral, 2 times daily    docusate sodium (Colace) 50 mg capsule Take one to two capsules by mouth once to twice daily as needed for constipation as directed. Up to 4 capsules daily (200 mg). Do not exceed 200 mg daily without discussing with healthcare provider.    escitalopram (LEXAPRO) 5 mg, oral, Daily    lisinopril 40 mg, oral, Daily    Mounjaro 5 mg, subcutaneous, Weekly    polyethylene glycol (Miralax) 17 gram/dose powder Dissolve 17 g (one capful) in 4-8 oz of any fluid and drink by mouth once to twice daily as needed for constipation.      MEDICATION RECONCILIATION  Added: none  Changed: none  Removed: none    Drug Interactions:  None warranting change in therapy at this time    CURRENT DIABETES PHARMACOTHERAPY  Mounjaro 5  injected subcutaneously once weekly    HISTORICAL PHARMACOTHERAPY  Metformin   Trulicity 4.5 mg once weekly  Insulin - Humalog    Comorbidity/Complications Regimen: needs optimization  Lipids:   Statin? no  LDL: not at goal (< 70 mg/dL) 2023, due  BP:  BP: not at goal, < 130/80  ACE/ARB? yes  Renal:   eGFR = >= 90 mL/min/BSA (normal) 2023, due  UACR: normal (< 30 mg/g) 2023, due  ASCVD: primary prevention  Aspirin? no  _______________________________________________________________________    DISCUSSION/NOTES    1 mo FUV after increase to Mounjaro. Continues to tolerate well, denies concerning side effects.   Reports improvement in appetite suppression. Endorses weight loss.  Comorbidity/complication regimen:  Overdue for routine labs. Recommend prior to next PCP visit 9/2025.  At last measure, lipids not at goal. Recommend repeat lab and initiation of mod-high intensity statin to target LDL < 70 mg/dL.  Obesity - continue Mounjaro, lifestyle modification to reduce weight and decrease risk of complications of obesity.       Assessment/Plan   Problem List Items Addressed This Visit       Type 2 diabetes mellitus without complication, without long-term current use of insulin - Primary    Relevant Orders    Referral to Clinical Pharmacy     Diabetes Mellitus: Type 2  Improving, last A1c = 6.6% on 5/30/25, goal A1c < 6.5%  Continues to tolerate Mounjaro well, denies concerns. Will continue on current dose for at least 4 more weeks. Pt to follow up with PCP at scheduled visit in 1 month.  Continue  Mounjaro to 5 mg injected subcutaneously once weekly     Comorbidity/Complication Regimen: needs optimization  Due for annual labs. Recommend prior to next PCP visit.  Recommend initiation of mod-high intensity statin to target LDL goal < 70 mg/dL.   Continue weight loss.     Clinical Pharmacist Follow Up: 2 mo    Tiffanie James, PharmD   Clinical Pharmacist  [phone]    Continue all meds under the continuation of care with  the referring provider and clinical pharmacy team. Verbal consent to manage patient's drug therapy was obtained from the patient. They were informed they may decline to participate or withdraw from participation in pharmacy services at any time.         [1]   Allergies  Allergen Reactions    Atorvastatin Other     myalgias    Rosuvastatin Other

## 2025-07-25 NOTE — Clinical Note
Doing well on increased dose of Mounjaro 5 mg once weekly. Will continue at same dose until next PCP visit in 1 month, to reevaluate at this time. FUV w/ clinical pharm 2 months.

## 2025-07-25 NOTE — PATIENT INSTRUCTIONS
Thank you for entrusting your care to the Clinical Pharmacy Team! We look forward to seeing you again soon.  Please call your clinical pharmacist with any questions or concerns.    Tiffanie James, PharmD  P: [phone]    To schedule an appointment, please contact:  P: [phone]

## 2025-07-31 ENCOUNTER — SPECIALTY PHARMACY (OUTPATIENT)
Dept: PHARMACY | Facility: CLINIC | Age: 45
End: 2025-07-31

## 2025-08-18 PROCEDURE — RXMED WILLOW AMBULATORY MEDICATION CHARGE

## 2025-08-19 ENCOUNTER — PHARMACY VISIT (OUTPATIENT)
Dept: PHARMACY | Facility: CLINIC | Age: 45
End: 2025-08-19
Payer: COMMERCIAL

## 2025-08-27 ENCOUNTER — SPECIALTY PHARMACY (OUTPATIENT)
Dept: PHARMACY | Facility: CLINIC | Age: 45
End: 2025-08-27

## 2025-08-27 DIAGNOSIS — F41.9 ANXIETY: ICD-10-CM

## 2025-08-27 PROCEDURE — RXMED WILLOW AMBULATORY MEDICATION CHARGE

## 2025-08-28 ENCOUNTER — SPECIALTY PHARMACY (OUTPATIENT)
Dept: PHARMACY | Facility: CLINIC | Age: 45
End: 2025-08-28

## 2025-08-28 RX ORDER — BUSPIRONE HYDROCHLORIDE 15 MG/1
15 TABLET ORAL 2 TIMES DAILY
Qty: 180 TABLET | Refills: 0 | OUTPATIENT
Start: 2025-08-28 | End: 2025-11-26

## 2025-08-29 ENCOUNTER — PHARMACY VISIT (OUTPATIENT)
Dept: PHARMACY | Facility: CLINIC | Age: 45
End: 2025-08-29
Payer: COMMERCIAL

## 2025-09-04 ENCOUNTER — APPOINTMENT (OUTPATIENT)
Dept: PRIMARY CARE | Facility: CLINIC | Age: 45
End: 2025-09-04
Payer: COMMERCIAL

## 2025-10-08 ENCOUNTER — APPOINTMENT (OUTPATIENT)
Dept: PHARMACY | Facility: HOSPITAL | Age: 45
End: 2025-10-08
Payer: COMMERCIAL